# Patient Record
Sex: FEMALE | Race: WHITE | URBAN - METROPOLITAN AREA
[De-identification: names, ages, dates, MRNs, and addresses within clinical notes are randomized per-mention and may not be internally consistent; named-entity substitution may affect disease eponyms.]

---

## 2023-07-27 ENCOUNTER — OFFICE VISIT (OUTPATIENT)
Dept: OBGYN CLINIC | Facility: CLINIC | Age: 23
End: 2023-07-27

## 2023-07-27 VITALS
WEIGHT: 240 LBS | HEIGHT: 63 IN | SYSTOLIC BLOOD PRESSURE: 110 MMHG | BODY MASS INDEX: 42.52 KG/M2 | DIASTOLIC BLOOD PRESSURE: 78 MMHG

## 2023-07-27 DIAGNOSIS — Z80.3 FAMILY HISTORY OF BREAST CANCER: ICD-10-CM

## 2023-07-27 DIAGNOSIS — Z01.419 ENCOUNTER FOR WELL WOMAN EXAM WITH ROUTINE GYNECOLOGICAL EXAM: Primary | ICD-10-CM

## 2023-07-27 DIAGNOSIS — Z11.3 SCREEN FOR STD (SEXUALLY TRANSMITTED DISEASE): ICD-10-CM

## 2023-07-27 PROCEDURE — 3008F BODY MASS INDEX DOCD: CPT | Performed by: OBSTETRICS & GYNECOLOGY

## 2023-07-27 PROCEDURE — 99385 PREV VISIT NEW AGE 18-39: CPT | Performed by: OBSTETRICS & GYNECOLOGY

## 2023-07-27 PROCEDURE — 3074F SYST BP LT 130 MM HG: CPT | Performed by: OBSTETRICS & GYNECOLOGY

## 2023-07-27 PROCEDURE — 3078F DIAST BP <80 MM HG: CPT | Performed by: OBSTETRICS & GYNECOLOGY

## 2023-07-27 RX ORDER — DAPSONE 50 MG/G
GEL TOPICAL
COMMUNITY
Start: 2023-07-19

## 2023-07-27 RX ORDER — SUMATRIPTAN 100 MG/1
100 TABLET, FILM COATED ORAL EVERY 2 HOUR PRN
COMMUNITY
Start: 2022-11-16

## 2023-07-27 RX ORDER — SPIRONOLACTONE 50 MG/1
50 TABLET, FILM COATED ORAL DAILY
COMMUNITY
Start: 2023-07-19

## 2023-07-28 LAB
C TRACH DNA SPEC QL NAA+PROBE: NEGATIVE
N GONORRHOEA DNA SPEC QL NAA+PROBE: NEGATIVE

## 2023-08-14 LAB — HPV I/H RISK 1 DNA SPEC QL NAA+PROBE: NEGATIVE

## 2023-08-24 PROBLEM — F34.1 DYSTHYMIC DISORDER: Status: ACTIVE | Noted: 2022-11-19

## 2023-08-24 PROBLEM — R30.0 DYSURIA: Status: ACTIVE | Noted: 2020-10-08

## 2023-08-24 PROBLEM — D36.9 DERMOID CYST: Status: ACTIVE | Noted: 2020-07-01

## 2023-08-29 ENCOUNTER — EKG ENCOUNTER (OUTPATIENT)
Dept: LAB | Age: 23
End: 2023-08-29
Attending: INTERNAL MEDICINE
Payer: COMMERCIAL

## 2023-08-29 ENCOUNTER — TELEPHONE (OUTPATIENT)
Dept: OBGYN CLINIC | Facility: CLINIC | Age: 23
End: 2023-08-29

## 2023-08-29 ENCOUNTER — OFFICE VISIT (OUTPATIENT)
Dept: INTERNAL MEDICINE CLINIC | Facility: CLINIC | Age: 23
End: 2023-08-29

## 2023-08-29 VITALS
HEART RATE: 94 BPM | BODY MASS INDEX: 42.52 KG/M2 | SYSTOLIC BLOOD PRESSURE: 104 MMHG | DIASTOLIC BLOOD PRESSURE: 70 MMHG | HEIGHT: 63 IN | WEIGHT: 240 LBS

## 2023-08-29 DIAGNOSIS — Z51.81 THERAPEUTIC DRUG MONITORING: ICD-10-CM

## 2023-08-29 DIAGNOSIS — G43.109 MIGRAINE WITH AURA AND WITHOUT STATUS MIGRAINOSUS, NOT INTRACTABLE: ICD-10-CM

## 2023-08-29 DIAGNOSIS — E66.01 OBESITY, CLASS III, BMI 40-49.9 (MORBID OBESITY) (HCC): ICD-10-CM

## 2023-08-29 DIAGNOSIS — Z00.00 ANNUAL PHYSICAL EXAM: Primary | ICD-10-CM

## 2023-08-29 LAB
ATRIAL RATE: 85 BPM
P AXIS: 40 DEGREES
P-R INTERVAL: 166 MS
Q-T INTERVAL: 354 MS
QRS DURATION: 76 MS
QTC CALCULATION (BEZET): 421 MS
R AXIS: 58 DEGREES
T AXIS: 23 DEGREES
VENTRICULAR RATE: 85 BPM

## 2023-08-29 PROCEDURE — 99204 OFFICE O/P NEW MOD 45 MIN: CPT | Performed by: INTERNAL MEDICINE

## 2023-08-29 PROCEDURE — 93010 ELECTROCARDIOGRAM REPORT: CPT | Performed by: INTERNAL MEDICINE

## 2023-08-29 PROCEDURE — 3008F BODY MASS INDEX DOCD: CPT | Performed by: INTERNAL MEDICINE

## 2023-08-29 PROCEDURE — 99385 PREV VISIT NEW AGE 18-39: CPT | Performed by: INTERNAL MEDICINE

## 2023-08-29 PROCEDURE — 93005 ELECTROCARDIOGRAM TRACING: CPT

## 2023-08-29 PROCEDURE — 3078F DIAST BP <80 MM HG: CPT | Performed by: INTERNAL MEDICINE

## 2023-08-29 PROCEDURE — 3074F SYST BP LT 130 MM HG: CPT | Performed by: INTERNAL MEDICINE

## 2023-08-29 RX ORDER — PHENTERMINE HYDROCHLORIDE 15 MG/1
15 CAPSULE ORAL EVERY MORNING
Qty: 30 CAPSULE | Refills: 0 | Status: SHIPPED | OUTPATIENT
Start: 2023-08-29

## 2023-08-30 ENCOUNTER — LAB ENCOUNTER (OUTPATIENT)
Dept: LAB | Age: 23
End: 2023-08-30
Attending: INTERNAL MEDICINE
Payer: COMMERCIAL

## 2023-08-30 DIAGNOSIS — Z00.00 ANNUAL PHYSICAL EXAM: ICD-10-CM

## 2023-08-30 LAB
ALBUMIN SERPL-MCNC: 3.6 G/DL (ref 3.4–5)
ALBUMIN/GLOB SERPL: 0.9 {RATIO} (ref 1–2)
ALP LIVER SERPL-CCNC: 81 U/L
ALT SERPL-CCNC: 45 U/L
ANION GAP SERPL CALC-SCNC: 5 MMOL/L (ref 0–18)
AST SERPL-CCNC: 27 U/L (ref 15–37)
BASOPHILS # BLD AUTO: 0.03 X10(3) UL (ref 0–0.2)
BASOPHILS NFR BLD AUTO: 0.3 %
BILIRUB SERPL-MCNC: 0.4 MG/DL (ref 0.1–2)
BUN BLD-MCNC: 10 MG/DL (ref 7–18)
BUN/CREAT SERPL: 11.2 (ref 10–20)
CALCIUM BLD-MCNC: 8.8 MG/DL (ref 8.5–10.1)
CHLORIDE SERPL-SCNC: 108 MMOL/L (ref 98–112)
CHOLEST SERPL-MCNC: 153 MG/DL (ref ?–200)
CO2 SERPL-SCNC: 26 MMOL/L (ref 21–32)
CREAT BLD-MCNC: 0.89 MG/DL
DEPRECATED RDW RBC AUTO: 44.7 FL (ref 35.1–46.3)
EGFRCR SERPLBLD CKD-EPI 2021: 93 ML/MIN/1.73M2 (ref 60–?)
EOSINOPHIL # BLD AUTO: 0.1 X10(3) UL (ref 0–0.7)
EOSINOPHIL NFR BLD AUTO: 1.1 %
ERYTHROCYTE [DISTWIDTH] IN BLOOD BY AUTOMATED COUNT: 13 % (ref 11–15)
FASTING PATIENT LIPID ANSWER: YES
FASTING STATUS PATIENT QL REPORTED: YES
GLOBULIN PLAS-MCNC: 3.8 G/DL (ref 2.8–4.4)
GLUCOSE BLD-MCNC: 92 MG/DL (ref 70–99)
HCT VFR BLD AUTO: 43.6 %
HDLC SERPL-MCNC: 47 MG/DL (ref 40–59)
HGB BLD-MCNC: 14.5 G/DL
IMM GRANULOCYTES # BLD AUTO: 0.03 X10(3) UL (ref 0–1)
IMM GRANULOCYTES NFR BLD: 0.3 %
LDLC SERPL CALC-MCNC: 91 MG/DL (ref ?–100)
LYMPHOCYTES # BLD AUTO: 2.14 X10(3) UL (ref 1–4)
LYMPHOCYTES NFR BLD AUTO: 24.3 %
MCH RBC QN AUTO: 31.2 PG (ref 26–34)
MCHC RBC AUTO-ENTMCNC: 33.3 G/DL (ref 31–37)
MCV RBC AUTO: 93.8 FL
MONOCYTES # BLD AUTO: 0.52 X10(3) UL (ref 0.1–1)
MONOCYTES NFR BLD AUTO: 5.9 %
NEUTROPHILS # BLD AUTO: 5.97 X10 (3) UL (ref 1.5–7.7)
NEUTROPHILS # BLD AUTO: 5.97 X10(3) UL (ref 1.5–7.7)
NEUTROPHILS NFR BLD AUTO: 68.1 %
NONHDLC SERPL-MCNC: 106 MG/DL (ref ?–130)
OSMOLALITY SERPL CALC.SUM OF ELEC: 287 MOSM/KG (ref 275–295)
PLATELET # BLD AUTO: 333 10(3)UL (ref 150–450)
POTASSIUM SERPL-SCNC: 4.2 MMOL/L (ref 3.5–5.1)
PROT SERPL-MCNC: 7.4 G/DL (ref 6.4–8.2)
RBC # BLD AUTO: 4.65 X10(6)UL
SODIUM SERPL-SCNC: 139 MMOL/L (ref 136–145)
TRIGL SERPL-MCNC: 76 MG/DL (ref 30–149)
TSI SER-ACNC: 1.56 MIU/ML (ref 0.36–3.74)
VIT D+METAB SERPL-MCNC: 27.8 NG/ML (ref 30–100)
VLDLC SERPL CALC-MCNC: 12 MG/DL (ref 0–30)
WBC # BLD AUTO: 8.8 X10(3) UL (ref 4–11)

## 2023-08-30 PROCEDURE — 85025 COMPLETE CBC W/AUTO DIFF WBC: CPT

## 2023-08-30 PROCEDURE — 82306 VITAMIN D 25 HYDROXY: CPT

## 2023-08-30 PROCEDURE — 80061 LIPID PANEL: CPT

## 2023-08-30 PROCEDURE — 80053 COMPREHEN METABOLIC PANEL: CPT

## 2023-08-30 PROCEDURE — 36415 COLL VENOUS BLD VENIPUNCTURE: CPT

## 2023-08-30 PROCEDURE — 84443 ASSAY THYROID STIM HORMONE: CPT

## 2023-08-31 RX ORDER — ERGOCALCIFEROL 1.25 MG/1
50000 CAPSULE ORAL WEEKLY
Qty: 12 CAPSULE | Refills: 0 | Status: SHIPPED | OUTPATIENT
Start: 2023-08-31 | End: 2023-11-17

## 2023-09-26 ENCOUNTER — TELEPHONE (OUTPATIENT)
Dept: INTERNAL MEDICINE CLINIC | Facility: CLINIC | Age: 23
End: 2023-09-26

## 2023-09-26 ENCOUNTER — OFFICE VISIT (OUTPATIENT)
Dept: INTERNAL MEDICINE CLINIC | Facility: CLINIC | Age: 23
End: 2023-09-26

## 2023-09-26 VITALS
BODY MASS INDEX: 40.54 KG/M2 | HEIGHT: 63 IN | RESPIRATION RATE: 18 BRPM | WEIGHT: 228.81 LBS | SYSTOLIC BLOOD PRESSURE: 108 MMHG | HEART RATE: 83 BPM | OXYGEN SATURATION: 99 % | DIASTOLIC BLOOD PRESSURE: 72 MMHG

## 2023-09-26 DIAGNOSIS — E55.9 VITAMIN D DEFICIENCY: ICD-10-CM

## 2023-09-26 DIAGNOSIS — G43.109 MIGRAINE WITH AURA AND WITHOUT STATUS MIGRAINOSUS, NOT INTRACTABLE: Primary | ICD-10-CM

## 2023-09-26 DIAGNOSIS — E66.01 OBESITY, CLASS III, BMI 40-49.9 (MORBID OBESITY) (HCC): ICD-10-CM

## 2023-09-26 DIAGNOSIS — Z51.81 THERAPEUTIC DRUG MONITORING: ICD-10-CM

## 2023-09-26 PROCEDURE — 3008F BODY MASS INDEX DOCD: CPT | Performed by: INTERNAL MEDICINE

## 2023-09-26 PROCEDURE — 3074F SYST BP LT 130 MM HG: CPT | Performed by: INTERNAL MEDICINE

## 2023-09-26 PROCEDURE — 99214 OFFICE O/P EST MOD 30 MIN: CPT | Performed by: INTERNAL MEDICINE

## 2023-09-26 PROCEDURE — 3078F DIAST BP <80 MM HG: CPT | Performed by: INTERNAL MEDICINE

## 2023-09-26 RX ORDER — PHENTERMINE HYDROCHLORIDE 37.5 MG/1
37.5 TABLET ORAL
Qty: 30 TABLET | Refills: 0 | Status: SHIPPED | OUTPATIENT
Start: 2023-09-26 | End: 2023-10-26

## 2023-09-26 RX ORDER — DOXYCYCLINE HYCLATE 100 MG/1
100 CAPSULE ORAL 2 TIMES DAILY WITH MEALS
COMMUNITY
Start: 2023-09-05

## 2023-09-26 NOTE — TELEPHONE ENCOUNTER
Received fax requesting PA    Current Outpatient Medications:       Phentermine HCl 37.5 MG Oral Tab, Take 1 tablet (37.5 mg total) by mouth every morning before breakfast., Disp: 30 tablet, Rfl: 0    KEY [id number]

## 2023-09-27 NOTE — TELEPHONE ENCOUNTER
Approved    Prior authorization approved Case ID: [id number]      Payer: [employer]    [phone]    [phone]   [id number]; Review Type:Prior Auth; Coverage Start Date:2023; Coverage End Date:2023;   Approval Details    Authorized from 2023 to 2023      Electronic appeal: Not supported   View History

## 2023-09-28 ENCOUNTER — TELEPHONE (OUTPATIENT)
Dept: INTERNAL MEDICINE CLINIC | Facility: CLINIC | Age: 23
End: 2023-09-28

## 2023-09-28 NOTE — TELEPHONE ENCOUNTER
Current Outpatient Medications:     Phentermine HCl 37.5 MG Oral Tab, Take 1 tablet (37.5 mg total) by mouth every morning before breakfast., Disp: 30 tablet, Rfl: 0

## 2023-10-03 ENCOUNTER — TELEPHONE (OUTPATIENT)
Dept: INTERNAL MEDICINE CLINIC | Facility: CLINIC | Age: 23
End: 2023-10-03

## 2023-10-03 DIAGNOSIS — R94.31 ABNORMAL EKG: Primary | ICD-10-CM

## 2023-10-03 NOTE — TELEPHONE ENCOUNTER
Pt saw Dr Darlene Howard on 9-26-23, she was told Dr Darlene Howard would enter an echocardiogram, none in to schedule. Send pt a mychart when entered.

## 2023-10-07 ENCOUNTER — PATIENT MESSAGE (OUTPATIENT)
Dept: INTERNAL MEDICINE CLINIC | Facility: CLINIC | Age: 23
End: 2023-10-07

## 2023-11-02 ENCOUNTER — OFFICE VISIT (OUTPATIENT)
Dept: INTERNAL MEDICINE CLINIC | Facility: CLINIC | Age: 23
End: 2023-11-02
Payer: COMMERCIAL

## 2023-11-02 VITALS
BODY MASS INDEX: 40.11 KG/M2 | OXYGEN SATURATION: 98 % | DIASTOLIC BLOOD PRESSURE: 76 MMHG | WEIGHT: 226.38 LBS | SYSTOLIC BLOOD PRESSURE: 116 MMHG | HEIGHT: 63 IN | HEART RATE: 80 BPM

## 2023-11-02 DIAGNOSIS — R94.31 ABNORMAL EKG: ICD-10-CM

## 2023-11-02 DIAGNOSIS — Z51.81 THERAPEUTIC DRUG MONITORING: ICD-10-CM

## 2023-11-02 DIAGNOSIS — G43.109 MIGRAINE WITH AURA AND WITHOUT STATUS MIGRAINOSUS, NOT INTRACTABLE: ICD-10-CM

## 2023-11-02 DIAGNOSIS — E55.9 VITAMIN D DEFICIENCY: Primary | ICD-10-CM

## 2023-11-02 DIAGNOSIS — E66.01 OBESITY, CLASS III, BMI 40-49.9 (MORBID OBESITY) (HCC): ICD-10-CM

## 2023-11-02 PROCEDURE — 3078F DIAST BP <80 MM HG: CPT | Performed by: INTERNAL MEDICINE

## 2023-11-02 PROCEDURE — 3008F BODY MASS INDEX DOCD: CPT | Performed by: INTERNAL MEDICINE

## 2023-11-02 PROCEDURE — 3074F SYST BP LT 130 MM HG: CPT | Performed by: INTERNAL MEDICINE

## 2023-11-02 PROCEDURE — 99214 OFFICE O/P EST MOD 30 MIN: CPT | Performed by: INTERNAL MEDICINE

## 2023-11-02 RX ORDER — PHENTERMINE HYDROCHLORIDE 37.5 MG/1
37.5 CAPSULE ORAL EVERY MORNING
Qty: 30 CAPSULE | Refills: 1 | Status: SHIPPED | OUTPATIENT
Start: 2023-11-02 | End: 2024-01-01

## 2023-11-03 ENCOUNTER — TELEPHONE (OUTPATIENT)
Dept: INTERNAL MEDICINE CLINIC | Facility: CLINIC | Age: 23
End: 2023-11-03

## 2023-11-03 NOTE — TELEPHONE ENCOUNTER
Received fax from pharmacy requesting PA         Phentermine HCl 37.5 MG Oral Cap, Take 1 capsule (37.5 mg total) by mouth every morning., Disp: 30 capsule, Rfl: 1    KEY: [id number]

## 2023-11-03 NOTE — TELEPHONE ENCOUNTER
Approved =sent patient mychart message    Prior authorization approved Case ID: [id number]      Payer: [employer] 19    [phone]    [phone]   CaseId:[id number];Status:Approved; Review Type:Prior Auth; Coverage Start Date:10/04/2023; Coverage End Date:02/01/2024;    Approval Details    Authorized from October 4, 2023 to February 1, 2024

## 2023-12-27 ENCOUNTER — HOSPITAL ENCOUNTER (OUTPATIENT)
Dept: CV DIAGNOSTICS | Facility: HOSPITAL | Age: 23
Discharge: HOME | End: 2023-12-27
Attending: INTERNAL MEDICINE
Payer: COMMERCIAL

## 2023-12-27 DIAGNOSIS — R94.31 ABNORMAL EKG: ICD-10-CM

## 2023-12-27 PROCEDURE — 93306 TTE W/DOPPLER COMPLETE: CPT | Performed by: INTERNAL MEDICINE

## 2023-12-28 ENCOUNTER — TELEPHONE (OUTPATIENT)
Dept: INTERNAL MEDICINE CLINIC | Facility: CLINIC | Age: 23
End: 2023-12-28

## 2023-12-28 NOTE — TELEPHONE ENCOUNTER
Patient calling (identified name and ) states she had an Echo done yesterday. States she is aware that Dr. Moreno has not reviewed the results yet but is very concerned and anxious with the abnormal report regarding an \"interatrial septal aneurysm\". Please advise.

## 2024-01-02 ENCOUNTER — OFFICE VISIT (OUTPATIENT)
Dept: INTERNAL MEDICINE CLINIC | Facility: CLINIC | Age: 24
End: 2024-01-02
Payer: COMMERCIAL

## 2024-01-02 VITALS
OXYGEN SATURATION: 99 % | HEART RATE: 98 BPM | SYSTOLIC BLOOD PRESSURE: 109 MMHG | WEIGHT: 219.81 LBS | HEIGHT: 63 IN | DIASTOLIC BLOOD PRESSURE: 74 MMHG | BODY MASS INDEX: 38.95 KG/M2

## 2024-01-02 DIAGNOSIS — E55.9 VITAMIN D DEFICIENCY: Primary | ICD-10-CM

## 2024-01-02 DIAGNOSIS — G43.109 MIGRAINE WITH AURA AND WITHOUT STATUS MIGRAINOSUS, NOT INTRACTABLE: ICD-10-CM

## 2024-01-02 DIAGNOSIS — E66.01 OBESITY, CLASS III, BMI 40-49.9 (MORBID OBESITY) (HCC): ICD-10-CM

## 2024-01-02 DIAGNOSIS — Z51.81 THERAPEUTIC DRUG MONITORING: ICD-10-CM

## 2024-01-02 PROCEDURE — 3078F DIAST BP <80 MM HG: CPT | Performed by: INTERNAL MEDICINE

## 2024-01-02 PROCEDURE — 3074F SYST BP LT 130 MM HG: CPT | Performed by: INTERNAL MEDICINE

## 2024-01-02 PROCEDURE — 3008F BODY MASS INDEX DOCD: CPT | Performed by: INTERNAL MEDICINE

## 2024-01-02 PROCEDURE — 99214 OFFICE O/P EST MOD 30 MIN: CPT | Performed by: INTERNAL MEDICINE

## 2024-01-02 RX ORDER — PHENTERMINE HYDROCHLORIDE 37.5 MG/1
37.5 CAPSULE ORAL EVERY MORNING
Qty: 30 CAPSULE | Refills: 1 | Status: SHIPPED | OUTPATIENT
Start: 2024-01-02

## 2024-01-02 NOTE — PROGRESS NOTES
CC:   Chief Complaint   Patient presents with    Follow - Up     2 month       Reason for medical consultation: Medical Management of Weight and Weight related comorbidities.      HPI:   Initial weight: 240 lbs 8/2023  Current weight: 219 lbs  Interval weight loss: 7 lbs  Total weight loss: 21 lbs    Body mass index is 38.94 kg/m².   Wt Readings from Last 6 Encounters:   01/02/24 219 lb 12.8 oz (99.7 kg)   11/02/23 226 lb 6.4 oz (102.7 kg)   09/26/23 228 lb 12.8 oz (103.8 kg)   08/29/23 240 lb (108.9 kg)   07/27/23 240 lb (108.9 kg)   11/23/16 153 lb (69.4 kg) (88%, Z= 1.18)*     * Growth percentiles are based on CDC (Girls, 2-20 Years) data.     /74   Pulse 98   Ht 5' 3\" (1.6 m)   Wt 219 lb 12.8 oz (99.7 kg)   SpO2 99%   BMI 38.94 kg/m²     LABS and RESULTS:     Office Visit on 07/27/2023   Component Date Value    Chlamydia Trachomatis Am* 07/27/2023 Negative     Neisseria Gonorrhoeae Am* 07/27/2023 Negative     Chlam/GC Source 07/27/2023 Cervical/endocervical     Thin Prep Pap 07/27/2023 AP TEST REFLEXED     Interpretation/Result 07/27/2023 Negative for intraepithelial lesion or malignancy     Specimen Adequacy 07/27/2023 Satisfactory for evaluation. No endocervical or metaplastic cells seen     General Categorization 07/27/2023 Negative for intraepithelial lesion or malignancy     HPV High Risk mRNA 07/27/2023                      Value:This result contains rich text formatting which cannot be displayed here.    Recommendations/Comments 07/27/2023                      Value:This result contains rich text formatting which cannot be displayed here.    Procedure 07/27/2023                      Value:This result contains rich text formatting which cannot be displayed here.    Clinical Information 07/27/2023                      Value:This result contains rich text formatting which cannot be displayed here.    Reason for testing 07/27/2023 Screening     Gyn Additional Informati* 07/27/2023                       Value:This result contains rich text formatting which cannot be displayed here.    Case Report 07/27/2023                      Value:Gynecologic Cytology                              Case: [id number]                                  Authorizing Provider:  Becky Romo MD     Collected:           07/27/2023 07:15 PM          Ordering Location:     [facility]    Received:            07/31/2023 10:02 AM                                 Group, [address] [place] [address]                                                                    First Screen:          Lucía Man                                                               Specimen:    ThinPrep Imager Screening Pap, Cervical/endocervical                                       HPV High Risk 07/27/2023 Negative     HPV Source 07/27/2023 Cervical/endocervical        Current Outpatient Medications   Medication Sig Dispense Refill    SUMAtriptan Succinate 100 MG Oral Tab Take 1 tablet (100 mg total) by mouth every 2 (two) hours as needed.      Dapsone 5 % External Gel Apply topically.      tretinoin 0.025 % External Cream Apply topically.        Past Medical History:   Diagnosis Date    Allergic rhinitis     Migraine        Past Surgical History:   Procedure Laterality Date    OOPHORECTOMY Left     SALPINGECTOMY Left        Social History:  Social History     Socioeconomic History    Marital status: Single   Tobacco Use    Smoking status: Never    Smokeless tobacco: Never   Substance and Sexual Activity    Alcohol use: Yes     Comment: Social    Drug use: Never     Family History:  Family History   Problem Relation Age of Onset    Hypertension Father     Breast Cancer Mother 43    Breast Cancer Maternal Grandmother 80          REVIEW OF SYSTEMS:   10 point review of systems otherwise negative.  With the exception of HPI and assessment and plan        EXAM:   /74    Pulse 98   Ht 5' 3\" (1.6 m)   Wt 219 lb 12.8 oz (99.7 kg)   SpO2 99%   BMI 38.94 kg/m²   Body mass index is 38.94 kg/m².     GENERAL: NAD, conversant  SKIN: good turgor, no jaundice  HEENT: no scleral icterus,conjunctiva are clear, oropharynx clear, nl external nose and ears  NECK: supple, no carotid bruits, thyroid normal  LYMPH: no cervical LAD, no supraclavicular LAD  LUNGS: nl effort, clear to auscultation bilaterally  CARDIO: RRR, no murmur  ABDOMEN: NT/ND, no masses, no HSM   EXTREMITIES: gait normal, no edema   PSYCH: Oriented times three, appropriate affect    ASSESSMENT AND PLAN:     Nutritional Counseling: educate, track, dietician    Vitamin D Deficiency  2. Migraine with aura and without status migrainosus, not intractable  3. Obesity, Class III, BMI 40-49.9 (morbid obesity) (HCC)  4. Therapeutic drug monitoring  -Initial weight 240, BMI 42.5  -failed diet and exercise program >6 months  -denies any hx of CAD, htn, kidney stones or seizure  PHENTERMINE: Since the patient would like to try phentermine, and is aware of the potential side effects (hypertension, palpitations, tachycardia, and anxiety), I will give her a prescription today to be used in conjunction with the above diet and exercise program. The patient will check heart rate and blood pressure on a regular basis. Patient will call me if the BP goes over 140/90 or has palpitations or racing heart rate. They understands that I will not call in the prescription and an appointment is needed to have the medication refilled.   -EKG 8/2023 NSR, possible LAE  -labs 8/2023 vit D deficiency, normal cbc, TSH, cmp  - Echo 12/2023 wnl  Plan:    - Phentermine HCl 37.5 MG Oral Cap; Take 1 capsule (15 mg total) by mouth every morning.  Dispense: 30 capsule; Refill: 0  -Discussed Kcal goals <1800 Kcal, carbohydrate goal <100 grams  -Exercise goal 1 hour daily  -Stress management 10 minutes meditation before bedtime  -Drink at least 64 oz water  daily      Regarding Obesity:  Consulted on nutrition and activity  Advised portion-controlled/low carb diet <100 g daily.  Discussed sleep and stress management.    Regarding weight loss medications.  I've discussed with patients the risks and benefits of such weight loss medications.  That these are treatments to be used in conjunction with diet and exercise for promoting health and weight loss.                No follow-ups on file.       Suzanne Moreno MD

## 2024-01-24 DIAGNOSIS — E66.01 OBESITY, CLASS III, BMI 40-49.9 (MORBID OBESITY) (HCC): ICD-10-CM

## 2024-01-25 DIAGNOSIS — E66.01 OBESITY, CLASS III, BMI 40-49.9 (MORBID OBESITY) (HCC): ICD-10-CM

## 2024-01-25 RX ORDER — PHENTERMINE HYDROCHLORIDE 37.5 MG/1
37.5 CAPSULE ORAL EVERY MORNING
Qty: 30 CAPSULE | Refills: 0 | OUTPATIENT
Start: 2024-01-25

## 2024-01-25 NOTE — TELEPHONE ENCOUNTER
Pharmacy    [id number] - [place], [place] - [address] AT [facility], [phone], [phone]      Disp Refills Start End    Phentermine HCl 37.5 MG Oral Cap 30 capsule 1 1/2/2024 --    Sig - Route: Take 1 capsule (37.5 mg total) by mouth every morning. - Oral    Sent to pharmacy as: Phentermine HCl 37.5 MG Oral Capsule    E-Prescribing Status: Receipt confirmed by pharmacy (1/2/2024  8:13 AM CST)

## 2024-01-25 NOTE — TELEPHONE ENCOUNTER
Patient calling ( identified name and  ) states she did not get the  Phentermine medication from 2024  because was told by [facility] is was too early to fill     Today the patient was told by [facility] they do not have the prescription       Called [facility]  spoke with no one after a 6 minute hold    Called [facility] again, spoke with Nina pharmacist   Nina states she does not have the RX from 2024  Nina took verbal for this RX ,  RX will be processed today    Called patient ( identified name and  ) informed RX will be ready today

## 2024-01-26 NOTE — TELEPHONE ENCOUNTER
Protocol Failed/ No Protocol    Requested Prescriptions   Pending Prescriptions Disp Refills    PHENTERMINE HCL 37.5 MG Oral Cap [Pharmacy Med Name: PHENTERMINE 37.5MG CAPSULES] 30 capsule 0     Sig: TAKE 1 CAPSULE(37.5 MG) BY MOUTH EVERY MORNING       There is no refill protocol information for this order          Future Appointments         Provider Department Appt Notes    In 1 month Suzanne Moreno MD [facility] 2 mth follow up    In 6 months Becky Romo MD [facility] - OB/GYN Annual          Recent Outpatient Visits              3 weeks ago Vitamin D deficiency    [facility] Suzanne Moreno MD    Office Visit    2 months ago Vitamin D deficiency    [facility] Suzanne Moreno MD    Office Visit    4 months ago Migraine with aura and without status migrainosus, not intractable    [facility] Suzanne Moreno MD    Office Visit    4 months ago Annual physical exam    [facility] Suzanne Moreno MD    Office Visit    6 months ago Encounter for well woman exam with routine gynecological exam    [address] Becky Romo MD    Office Visit

## 2024-01-29 RX ORDER — PHENTERMINE HYDROCHLORIDE 37.5 MG/1
37.5 CAPSULE ORAL EVERY MORNING
Qty: 30 CAPSULE | Refills: 1 | Status: SHIPPED | OUTPATIENT
Start: 2024-01-29

## 2024-03-04 ENCOUNTER — OFFICE VISIT (OUTPATIENT)
Dept: INTERNAL MEDICINE CLINIC | Facility: CLINIC | Age: 24
End: 2024-03-04
Payer: COMMERCIAL

## 2024-03-04 ENCOUNTER — TELEPHONE (OUTPATIENT)
Dept: INTERNAL MEDICINE CLINIC | Facility: CLINIC | Age: 24
End: 2024-03-04

## 2024-03-04 VITALS
WEIGHT: 206.38 LBS | HEART RATE: 92 BPM | DIASTOLIC BLOOD PRESSURE: 75 MMHG | SYSTOLIC BLOOD PRESSURE: 119 MMHG | OXYGEN SATURATION: 96 % | HEIGHT: 63 IN | BODY MASS INDEX: 36.57 KG/M2

## 2024-03-04 DIAGNOSIS — E55.9 VITAMIN D DEFICIENCY: ICD-10-CM

## 2024-03-04 DIAGNOSIS — G43.109 MIGRAINE WITH AURA AND WITHOUT STATUS MIGRAINOSUS, NOT INTRACTABLE: ICD-10-CM

## 2024-03-04 DIAGNOSIS — Z51.81 THERAPEUTIC DRUG MONITORING: ICD-10-CM

## 2024-03-04 DIAGNOSIS — E66.01 OBESITY, CLASS III, BMI 40-49.9 (MORBID OBESITY) (HCC): Primary | ICD-10-CM

## 2024-03-04 DIAGNOSIS — F34.1 DYSTHYMIC DISORDER: ICD-10-CM

## 2024-03-04 PROCEDURE — 3078F DIAST BP <80 MM HG: CPT | Performed by: INTERNAL MEDICINE

## 2024-03-04 PROCEDURE — 3008F BODY MASS INDEX DOCD: CPT | Performed by: INTERNAL MEDICINE

## 2024-03-04 PROCEDURE — 99214 OFFICE O/P EST MOD 30 MIN: CPT | Performed by: INTERNAL MEDICINE

## 2024-03-04 PROCEDURE — 3074F SYST BP LT 130 MM HG: CPT | Performed by: INTERNAL MEDICINE

## 2024-03-04 RX ORDER — ADAPALENE GEL USP, 0.3% 3 MG/G
GEL TOPICAL NIGHTLY
COMMUNITY
Start: 2024-01-25

## 2024-03-04 RX ORDER — DOXYCYCLINE HYCLATE 100 MG/1
100 CAPSULE ORAL 2 TIMES DAILY WITH MEALS
COMMUNITY
Start: 2024-02-14

## 2024-03-04 RX ORDER — KETOCONAZOLE 20 MG/ML
1 SHAMPOO TOPICAL
COMMUNITY
Start: 2024-01-17

## 2024-03-04 RX ORDER — PHENTERMINE HYDROCHLORIDE 37.5 MG/1
37.5 CAPSULE ORAL EVERY MORNING
Qty: 30 CAPSULE | Refills: 1 | Status: SHIPPED | OUTPATIENT
Start: 2024-03-04

## 2024-03-04 NOTE — TELEPHONE ENCOUNTER
Phentermine prior auth completed     Approved    Prior authorization approved Case ID: [id number]      Payer: [employer]    [phone]    [phone]   CaseId:[id number];Status:Approved;Review Type:Prior Auth;Coverage Start Date:02/03/2024;Coverage End Date:03/04/2025;   Approval Details    Authorized from February 3, 2024 to March 4, 2025      Electronic appeal: Not supported   View History

## 2024-03-04 NOTE — PROGRESS NOTES
CC:   Chief Complaint   Patient presents with    Follow - Up     2 month       Reason for medical consultation: Medical Management of Weight and Weight related comorbidities.      HPI:   Patient gets stressed around this time of year   She is on waitlist for therapy  Will send [facility] referral to [facility]     Initial weight: 240 lbs 8/2023  Current weight: 209 lbs  Interval weight loss: 10 lbs  Total weight loss: 31 lbs    Body mass index is 36.56 kg/m².   Wt Readings from Last 6 Encounters:   03/04/24 206 lb 6.4 oz (93.6 kg)   01/02/24 219 lb 12.8 oz (99.7 kg)   11/02/23 226 lb 6.4 oz (102.7 kg)   09/26/23 228 lb 12.8 oz (103.8 kg)   08/29/23 240 lb (108.9 kg)   07/27/23 240 lb (108.9 kg)     /75   Pulse 92   Ht 5' 3\" (1.6 m)   Wt 206 lb 6.4 oz (93.6 kg)   SpO2 96%   BMI 36.56 kg/m²     LABS and RESULTS:     Office Visit on 07/27/2023   Component Date Value    Chlamydia Trachomatis Am* 07/27/2023 Negative     Neisseria Gonorrhoeae Am* 07/27/2023 Negative     Chlam/GC Source 07/27/2023 Cervical/endocervical     Thin Prep Pap 07/27/2023 AP TEST REFLEXED     Interpretation/Result 07/27/2023 Negative for intraepithelial lesion or malignancy     Specimen Adequacy 07/27/2023 Satisfactory for evaluation. No endocervical or metaplastic cells seen     General Categorization 07/27/2023 Negative for intraepithelial lesion or malignancy     HPV High Risk mRNA 07/27/2023                      Value:This result contains rich text formatting which cannot be displayed here.    Recommendations/Comments 07/27/2023                      Value:This result contains rich text formatting which cannot be displayed here.    Procedure 07/27/2023                      Value:This result contains rich text formatting which cannot be displayed here.    Clinical Information 07/27/2023                      Value:This result contains rich text formatting which cannot be displayed here.    Reason for testing 07/27/2023 Screening     Gyn  Additional Informati* 07/27/2023                      Value:This result contains rich text formatting which cannot be displayed here.    Case Report 07/27/2023                      Value:Gynecologic Cytology                              Case: [id number]                                  Authorizing Provider:  Becky Romo MD     Collected:           07/27/2023 07:15 PM          Ordering Location:     [facility]    Received:            07/31/2023 10:02 AM                                 Group, [address] [place] [address]                                                                    First Screen:          Lucía Man                                                               Specimen:    ThinPrep Imager Screening Pap, Cervical/endocervical                                       HPV High Risk 07/27/2023 Negative     HPV Source 07/27/2023 Cervical/endocervical        Current Outpatient Medications   Medication Sig Dispense Refill    Adapalene 0.3 % External Gel Apply topically nightly.      doxycycline 100 MG Oral Cap Take 1 capsule (100 mg total) by mouth 2 (two) times daily with meals.      ketoconazole 2 % External Shampoo Apply 1 Application topically 3 (three) times a week.      Phentermine HCl 37.5 MG Oral Cap Take 1 capsule (37.5 mg total) by mouth every morning. 30 capsule 1    SUMAtriptan Succinate 100 MG Oral Tab Take 1 tablet (100 mg total) by mouth every 2 (two) hours as needed.      Dapsone 5 % External Gel Apply topically.      tretinoin 0.025 % External Cream Apply topically.        Past Medical History:   Diagnosis Date    Allergic rhinitis     Migraine        Past Surgical History:   Procedure Laterality Date    OOPHORECTOMY Left     SALPINGECTOMY Left        Social History:  Social History     Socioeconomic History    Marital status: Single   Tobacco Use    Smoking status: Never    Smokeless tobacco:  Never   Substance and Sexual Activity    Alcohol use: Yes     Comment: Social    Drug use: Never     Family History:  Family History   Problem Relation Age of Onset    Hypertension Father     Breast Cancer Mother 43    Breast Cancer Maternal Grandmother 80          REVIEW OF SYSTEMS:   10 point review of systems otherwise negative.  With the exception of HPI and assessment and plan        EXAM:   /75   Pulse 92   Ht 5' 3\" (1.6 m)   Wt 206 lb 6.4 oz (93.6 kg)   SpO2 96%   BMI 36.56 kg/m²   Body mass index is 36.56 kg/m².     GENERAL: NAD, conversant  SKIN: good turgor, no jaundice  HEENT: no scleral icterus,conjunctiva are clear, oropharynx clear, nl external nose and ears  NECK: supple, no carotid bruits, thyroid normal  LYMPH: no cervical LAD, no supraclavicular LAD  LUNGS: nl effort, clear to auscultation bilaterally  CARDIO: RRR, no murmur  ABDOMEN: NT/ND, no masses, no HSM   EXTREMITIES: gait normal, no edema   PSYCH: Oriented times three, appropriate affect    ASSESSMENT AND PLAN:     Nutritional Counseling: educate, track, dietician    1. Obesity, Class III, BMI 40-49.9 (morbid obesity) (HCC)  2. Vitamin D deficiency  3. Migraine with aura and without status migrainosus, not intractable  4. Therapeutic drug monitoring  5. Dysthymic disorder    -Initial weight 240, BMI 42.5  -failed diet and exercise program >6 months  -denies any hx of CAD, htn, kidney stones or seizure  -PHENTERMINE: Since the patient would like to try phentermine, and is aware of the potential side effects (hypertension, palpitations, tachycardia, and anxiety), I will give her a prescription today to be used in conjunction with the above diet and exercise program. The patient will check heart rate and blood pressure on a regular basis. Patient will call me if the BP goes over 140/90 or has palpitations or racing heart rate. They understands that I will not call in the prescription and an appointment is needed to have the  medication refilled.   -EKG 8/2023 NSR, possible LAE  -labs 8/2023 vit D deficiency, normal cbc, TSH, cmp  - Echo 12/2023 wnl    Plan:  -recheck vitamin D  - Phentermine HCl 37.5 MG Oral Cap; Take 1 capsule (37.5 mg total) by mouth every morning.  Dispense: 30 capsule; Refill: 1  -Discussed Kcal goals <1800 Kcal, carbohydrate goal <100 grams  -Exercise goal 1 hour daily  -Stress management 10 minutes meditation before bedtime  -Drink at least 64 oz water daily      Regarding Obesity:  Consulted on nutrition and activity  Advised portion-controlled/low carb diet <100 g daily.  Discussed sleep and stress management.    Regarding weight loss medications.  I've discussed with patients the risks and benefits of such weight loss medications.  That these are treatments to be used in conjunction with diet and exercise for promoting health and weight loss.                Return in about 2 months (around 5/4/2024).       Suzanne Moreno MD

## 2024-03-06 ENCOUNTER — LAB ENCOUNTER (OUTPATIENT)
Dept: LAB | Age: 24
End: 2024-03-06
Attending: INTERNAL MEDICINE
Payer: COMMERCIAL

## 2024-03-06 DIAGNOSIS — F34.1 DYSTHYMIC DISORDER: ICD-10-CM

## 2024-03-06 DIAGNOSIS — E55.9 VITAMIN D DEFICIENCY: ICD-10-CM

## 2024-03-06 DIAGNOSIS — Z51.81 THERAPEUTIC DRUG MONITORING: ICD-10-CM

## 2024-03-06 LAB
VIT B12 SERPL-MCNC: 632 PG/ML (ref 211–911)
VIT D+METAB SERPL-MCNC: 36.4 NG/ML (ref 30–100)

## 2024-03-06 PROCEDURE — 82306 VITAMIN D 25 HYDROXY: CPT

## 2024-03-06 PROCEDURE — 36415 COLL VENOUS BLD VENIPUNCTURE: CPT

## 2024-03-06 PROCEDURE — 82607 VITAMIN B-12: CPT

## 2024-04-09 ENCOUNTER — TELEPHONE (OUTPATIENT)
Dept: OBGYN CLINIC | Facility: CLINIC | Age: 24
End: 2024-04-09

## 2024-04-09 NOTE — TELEPHONE ENCOUNTER
Pt with Mirena IUD. Currently some light spotting and light bleeding for 6 days; reports recent intercourse prior to bleeding. Pt states she typically doesn't get a period with the Mierna and might have light spotting about once every 6 months. Due to atypical bleeding, appointment for evaluation recommended. Appointment scheduled with [facility] 4/11.

## 2024-04-11 ENCOUNTER — OFFICE VISIT (OUTPATIENT)
Dept: OBGYN CLINIC | Facility: CLINIC | Age: 24
End: 2024-04-11

## 2024-04-11 VITALS
BODY MASS INDEX: 35.82 KG/M2 | WEIGHT: 202.19 LBS | SYSTOLIC BLOOD PRESSURE: 106 MMHG | HEIGHT: 63 IN | DIASTOLIC BLOOD PRESSURE: 68 MMHG

## 2024-04-11 DIAGNOSIS — Z97.5 BREAKTHROUGH BLEEDING ASSOCIATED WITH INTRAUTERINE DEVICE (IUD): Primary | ICD-10-CM

## 2024-04-11 DIAGNOSIS — N92.1 BREAKTHROUGH BLEEDING ASSOCIATED WITH INTRAUTERINE DEVICE (IUD): Primary | ICD-10-CM

## 2024-04-11 PROCEDURE — 3074F SYST BP LT 130 MM HG: CPT | Performed by: OBSTETRICS & GYNECOLOGY

## 2024-04-11 PROCEDURE — 3078F DIAST BP <80 MM HG: CPT | Performed by: OBSTETRICS & GYNECOLOGY

## 2024-04-11 PROCEDURE — 3008F BODY MASS INDEX DOCD: CPT | Performed by: OBSTETRICS & GYNECOLOGY

## 2024-04-11 PROCEDURE — 99213 OFFICE O/P EST LOW 20 MIN: CPT | Performed by: OBSTETRICS & GYNECOLOGY

## 2024-04-11 NOTE — PROGRESS NOTES
Chief Complaint   Patient presents with    IUD     check         Luanne Ramsey is a 24 year old female who presents for Irregular bleeding.    LMP: 04/04/24.    Menses regular: IUD.    Menstrual flow normal: Spotting.    Birth control or HRT: Mirena IUD.   Refill no  Last Pap Smear: 08/29/23 . Any history of abnormal paps: no   Gardasil:(age 9-44 y/o) Completed.   Any medication refills needed today?: no    Problems/concerns: patient reports she has been light bleeding/ spotting since last Thursday 04/04. Was Sexually active the night before and she noticed the blood when she woke up.  This has never happened since having the Mirena IUD placed.      Next Appt:         Immunization History   Administered Date(s) Administered    DTAP INFANRIX 04/25/2000, 06/20/2000, 08/22/2000, 08/20/2001, 02/27/2003, 04/10/2003, 06/16/2003, 03/19/2005    HEP B, Ped/Adol 03/21/2000, 04/25/2000, 08/22/2000    HIB 04/25/2000, 06/20/2000, 08/22/2000, 06/11/2001    Hep B, Unspecified Formulation 03/21/2000, 04/25/2000, 08/22/2000, 02/27/2003, 06/16/2003    Hib, Unspecified Formulation 04/25/2000, 06/20/2000, 08/22/2000, 06/11/2001, 02/27/2003, 04/10/2003, 06/16/2003    Hpv Virus Vaccine 9 Angie Im 07/11/2017, 09/14/2017, 01/11/2018    IPV 04/25/2000, 06/20/2000, 11/14/2000, 02/27/2003, 04/10/2003, 01/30/2005    MMR 06/11/2001, 03/19/2005    Meningococcal-Menactra 07/11/2017    Pneumococcal (Prevnar 13) 08/20/2001    Pneumococcal (Prevnar 7) 08/20/2001, 02/27/2003, 04/10/2003, 06/16/2003    RHO(D) Immune Globulin 03/21/2000, 04/25/2000, 08/22/2000, 02/27/2003, 06/16/2003    TDAP 07/30/2011, 11/16/2022    Varicella 08/20/2001, 07/30/2011         Current Outpatient Medications:     Adapalene 0.3 % External Gel, Apply topically nightly., Disp: , Rfl:     ketoconazole 2 % External Shampoo, Apply 1 Application topically 3 (three) times a week., Disp: , Rfl:     Phentermine HCl 37.5 MG Oral Cap, Take 1 capsule (37.5 mg total) by  mouth every morning., Disp: 30 capsule, Rfl: 1    SUMAtriptan Succinate 100 MG Oral Tab, Take 1 tablet (100 mg total) by mouth every 2 (two) hours as needed., Disp: , Rfl:     Dapsone 5 % External Gel, Apply topically., Disp: , Rfl:     tretinoin 0.025 % External Cream, Apply topically., Disp: , Rfl:     Allergies   Allergen Reactions    Amoxicillin RASH    [place]born HIVES    Pecan Nut (Diagnostic) SWELLING    Prunus Persica HIVES    Walnuts SWELLING       OB History    Para Term  AB Living   0 0 0 0 0 0   SAB IAB Ectopic Multiple Live Births   0 0 0 0 0       Past Medical History:    Abnormal uterine bleeding    Allergic rhinitis    Amenorrhea    due ti IUD    Anxiety    Migraine       Past Surgical History:   Procedure Laterality Date    Cyst removal  2020    Insert intrauterine device  2020    Oophorectomy Left     Salpingectomy Left        Family History   Problem Relation Age of Onset    Hypertension Father     Breast Cancer Mother 43        Triple Negative Breast Cancer    Cancer Mother         Triple Negative Breast Cancer    Breast Cancer Maternal Grandmother 80        Tobacco  Allergies  Meds  Med Hx  Surg Hx  Fam Hx  Soc Hx        Social History     Socioeconomic History    Marital status: Single     Spouse name: Not on file    Number of children: Not on file    Years of education: Not on file    Highest education level: Not on file   Occupational History    Not on file   Tobacco Use    Smoking status: Never    Smokeless tobacco: Never    Tobacco comments:     8 months of vape use only   Substance and Sexual Activity    Alcohol use: Yes     Alcohol/week: 1.0 standard drink of alcohol     Types: 1 Standard drinks or equivalent per week     Comment: Social    Drug use: Never    Sexual activity: Not on file   Other Topics Concern    Caffeine Concern Not Asked    Exercise Not Asked    Seat Belt Not Asked    Special Diet Not Asked    Stress Concern Not Asked    Weight Concern Not  Asked   Social History Narrative    Not on file     Social Determinants of Health     Financial Resource Strain: Low Risk  (11/16/2022)    [facility], [facility]    Overall Financial Resource Strain (CARDIA)     Difficulty of Paying Living Expenses: Not hard at all   Food Insecurity: No Food Insecurity (11/16/2022)    Received from [facility], [facility]    Hunger Vital Sign     Worried About Running Out of Food in the Last Year: Never true     Ran Out of Food in the Last Year: Never true   Transportation Needs: No Transportation Needs (11/16/2022)    Received from [facility], [facility]    PRAPARE - Transportation     Lack of Transportation (Medical): No     Lack of Transportation (Non-Medical): No   Physical Activity: Not on file   Stress: Not on file   Social Connections: Not on file   Housing Stability: Not on file   /68   Ht 5' 3\" (1.6 m)   Wt 202 lb 2.6 oz (91.7 kg)   LMP 04/04/2024     Wt Readings from Last 3 Encounters:   04/11/24 202 lb 2.6 oz (91.7 kg)   03/04/24 206 lb 6.4 oz (93.6 kg)   01/02/24 219 lb 12.8 oz (99.7 kg)       Health Maintenance   Topic Date Due    COVID-19 Vaccine (1 - 2023-24 season) 09/26/2024 (Originally 9/1/2023)    Influenza Vaccine (Season Ended) 11/02/2024 (Originally 10/1/2024)    Annual Physical  08/29/2024    Pap Smear  07/27/2026    DTaP,Tdap,and Td Vaccines (8 - Td or Tdap) 11/16/2032    Annual Depression Screening  Completed    HPV Vaccines  Completed    Pneumococcal Vaccine: Birth to 64yrs  Aged Out         Review of Systems   General: Present- Feeling well. Not Present- Fever.  Female Genitourinary: Not Present- Dysmenorrhea, Dyspareunia, Flank Pain, Frequency, Menstrual Irregularities, Pelvic Pain, Urgency, Urinary Complaints, Vaginal Bleeding and Vaginal dryness.  Pain: Present- Pain Rating - 0 on a 0-10 scale.  All  other systems negative       Physical Exam   The physical exam findings are as follows:     Abdomen   Inspection: - Inspection Normal.  Palpation/Percussion: Palpation and Percussion of the abdomen reveal - Non Tender, No hepatosplenomegaly and No Palpable abdominal masses.    Female Genitourinary     External Genitalia   Perineum - Normal. Bartholin's Gland - Bilateral - Normal. Clitoris - Normal.  Introitus: Characteristics - Normal. Discharge - None.  Labia Majora: Lesions - Bilateral - None. Characteristics - Bilateral - Normal.  Labia Minora: Lesions - Bilateral - None. Characteristics - Bilateral - Normal.  Urethra: Characteristics - Normal. Discharge - None.  McCausland Gland - Bilateral - Normal.  Vulva: Characteristics - Normal. Lesions - None.    Speculum & Bimanual   Vagina:   Vaginal Wall: - Normal.  Vaginal Lesions - None. Vaginal Mucosa - Normal.  Cervix: Characteristics - No Motion tenderness. Discharge - None. IUD strings at os   Uterus: Characteristics - Non Tender. Position - Midposition.  Adnexa: Characteristics - Bilateral - Tender. Masses - No Adnexal Masses.  Bladder - Normal.    Rectal   Anorectal Exam: External - normal external exam.    Lymphatic  General Lymphatics   Description - Normal .    Plan for NSAIDs to address BTB with IUD if no improvement will proceed with Doxy - IUD feels appropriately placed today     1. Breakthrough bleeding associated with intrauterine device (IUD)

## 2024-05-06 ENCOUNTER — OFFICE VISIT (OUTPATIENT)
Dept: INTERNAL MEDICINE CLINIC | Facility: CLINIC | Age: 24
End: 2024-05-06
Payer: COMMERCIAL

## 2024-05-06 VITALS
DIASTOLIC BLOOD PRESSURE: 72 MMHG | WEIGHT: 199.81 LBS | SYSTOLIC BLOOD PRESSURE: 106 MMHG | BODY MASS INDEX: 35.4 KG/M2 | HEART RATE: 102 BPM | HEIGHT: 63 IN | OXYGEN SATURATION: 100 %

## 2024-05-06 DIAGNOSIS — E66.01 OBESITY, CLASS III, BMI 40-49.9 (MORBID OBESITY) (HCC): ICD-10-CM

## 2024-05-06 DIAGNOSIS — G43.109 MIGRAINE WITH AURA AND WITHOUT STATUS MIGRAINOSUS, NOT INTRACTABLE: Primary | ICD-10-CM

## 2024-05-06 DIAGNOSIS — F34.1 DYSTHYMIC DISORDER: ICD-10-CM

## 2024-05-06 DIAGNOSIS — Z51.81 THERAPEUTIC DRUG MONITORING: ICD-10-CM

## 2024-05-06 DIAGNOSIS — E55.9 VITAMIN D DEFICIENCY: ICD-10-CM

## 2024-05-06 PROCEDURE — 99214 OFFICE O/P EST MOD 30 MIN: CPT | Performed by: INTERNAL MEDICINE

## 2024-05-06 PROCEDURE — 3008F BODY MASS INDEX DOCD: CPT | Performed by: INTERNAL MEDICINE

## 2024-05-06 PROCEDURE — 3074F SYST BP LT 130 MM HG: CPT | Performed by: INTERNAL MEDICINE

## 2024-05-06 PROCEDURE — 3078F DIAST BP <80 MM HG: CPT | Performed by: INTERNAL MEDICINE

## 2024-05-06 RX ORDER — PHENTERMINE HYDROCHLORIDE 37.5 MG/1
37.5 CAPSULE ORAL EVERY MORNING
Qty: 30 CAPSULE | Refills: 1 | Status: SHIPPED | OUTPATIENT
Start: 2024-05-06

## 2024-05-06 RX ORDER — SUMATRIPTAN 100 MG/1
100 TABLET, FILM COATED ORAL EVERY 2 HOUR PRN
Qty: 30 TABLET | Refills: 1 | Status: SHIPPED | OUTPATIENT
Start: 2024-05-06

## 2024-05-06 RX ORDER — BUPROPION HYDROCHLORIDE 150 MG/1
150 TABLET ORAL DAILY
Qty: 30 TABLET | Refills: 1 | Status: SHIPPED | OUTPATIENT
Start: 2024-05-06

## 2024-05-06 RX ORDER — ONDANSETRON 4 MG/1
4 TABLET, FILM COATED ORAL EVERY 8 HOURS PRN
Qty: 30 TABLET | Refills: 0 | Status: SHIPPED | OUTPATIENT
Start: 2024-05-06 | End: 2024-06-05

## 2024-05-06 NOTE — PROGRESS NOTES
CC:   Chief Complaint   Patient presents with    Follow - Up     Weight loss       Reason for medical consultation: Medical Management of Weight and Weight related comorbidities.      HPI:   Food noise around mid-day    Initial weight: 240 lbs 8/2023  Current weight: 199 lbs  Interval weight loss: 10 lbs  Total weight loss: 41 lbs    Body mass index is 35.39 kg/m².   Wt Readings from Last 6 Encounters:   05/06/24 199 lb 12.8 oz (90.6 kg)   04/11/24 202 lb 2.6 oz (91.7 kg)   03/04/24 206 lb 6.4 oz (93.6 kg)   01/02/24 219 lb 12.8 oz (99.7 kg)   11/02/23 226 lb 6.4 oz (102.7 kg)   09/26/23 228 lb 12.8 oz (103.8 kg)     /72   Pulse 102   Ht 5' 3\" (1.6 m)   Wt 199 lb 12.8 oz (90.6 kg)   LMP 04/04/2024   SpO2 100%   BMI 35.39 kg/m²     LABS and RESULTS:     Office Visit on 07/27/2023   Component Date Value    Chlamydia Trachomatis Am* 07/27/2023 Negative     Neisseria Gonorrhoeae Am* 07/27/2023 Negative     Chlam/GC Source 07/27/2023 Cervical/endocervical     Thin Prep Pap 07/27/2023 AP TEST REFLEXED     Interpretation/Result 07/27/2023 Negative for intraepithelial lesion or malignancy     Specimen Adequacy 07/27/2023 Satisfactory for evaluation. No endocervical or metaplastic cells seen     General Categorization 07/27/2023 Negative for intraepithelial lesion or malignancy     HPV High Risk mRNA 07/27/2023                      Value:This result contains rich text formatting which cannot be displayed here.    Recommendations/Comments 07/27/2023                      Value:This result contains rich text formatting which cannot be displayed here.    Procedure 07/27/2023                      Value:This result contains rich text formatting which cannot be displayed here.    Clinical Information 07/27/2023                      Value:This result contains rich text formatting which cannot be displayed here.    Reason for testing 07/27/2023 Screening     Gyn Additional Informati* 07/27/2023                       Value:This result contains rich text formatting which cannot be displayed here.    Case Report 07/27/2023                      Value:Gynecologic Cytology                              Case: [id number]                                  Authorizing Provider:  Becky Romo MD     Collected:           07/27/2023 07:15 PM          Ordering Location:     [facility]    Received:            07/31/2023 10:02 AM                                 Group, [address] [place] [address]                                                                    First Screen:          Lucía Man                                                               Specimen:    ThinPrep Imager Screening Pap, Cervical/endocervical                                       HPV High Risk 07/27/2023 Negative     HPV Source 07/27/2023 Cervical/endocervical        Current Outpatient Medications   Medication Sig Dispense Refill    Phentermine HCl 37.5 MG Oral Cap Take 1 capsule (37.5 mg total) by mouth every morning. 30 capsule 1    buPROPion  MG Oral Tablet 24 Hr Take 1 tablet (150 mg total) by mouth daily. 30 tablet 1    SUMAtriptan Succinate 100 MG Oral Tab Take 1 tablet (100 mg total) by mouth every 2 (two) hours as needed for Migraine. 30 tablet 1    Adapalene 0.3 % External Gel Apply topically nightly.      ketoconazole 2 % External Shampoo Apply 1 Application topically 3 (three) times a week.      Dapsone 5 % External Gel Apply topically.      tretinoin 0.025 % External Cream Apply topically.        Past Medical History:    Abnormal uterine bleeding    Allergic rhinitis    Amenorrhea    due ti IUD    Anxiety    Migraine       Past Surgical History:   Procedure Laterality Date    Cyst removal  june 2020    Insert intrauterine device  November 2020    Oophorectomy Left     Salpingectomy Left        Social History:  Social History     Socioeconomic History     Marital status: Single   Tobacco Use    Smoking status: Never    Smokeless tobacco: Never    Tobacco comments:     8 months of vape use only   Substance and Sexual Activity    Alcohol use: Yes     Alcohol/week: 1.0 standard drink of alcohol     Types: 1 Standard drinks or equivalent per week     Comment: Social    Drug use: Never     Social Determinants of Health     Financial Resource Strain: Low Risk  (11/16/2022)    [facility] from [facility], [facility]    Overall Financial Resource Strain (CARDIA)     Difficulty of Paying Living Expenses: Not hard at all   Food Insecurity: No Food Insecurity (11/16/2022)    Received from [facility], [facility]    Hunger Vital Sign     Worried About Running Out of Food in the Last Year: Never true     Ran Out of Food in the Last Year: Never true   Transportation Needs: No Transportation Needs (11/16/2022)    Received from [facility], [facility]    PRAPARE - Transportation     Lack of Transportation (Medical): No     Lack of Transportation (Non-Medical): No     Family History:  Family History   Problem Relation Age of Onset    Hypertension Father     Breast Cancer Mother 43        Triple Negative Breast Cancer    Cancer Mother         Triple Negative Breast Cancer    Breast Cancer Maternal Grandmother 80          REVIEW OF SYSTEMS:   10 point review of systems otherwise negative.  With the exception of HPI and assessment and plan        EXAM:   /72   Pulse 102   Ht 5' 3\" (1.6 m)   Wt 199 lb 12.8 oz (90.6 kg)   LMP 04/04/2024   SpO2 100%   BMI 35.39 kg/m²   Body mass index is 35.39 kg/m².     GENERAL: NAD, conversant  SKIN: good turgor, no jaundice  HEENT: no scleral icterus,conjunctiva are clear, oropharynx clear, nl external nose and ears  NECK: supple, no carotid bruits, thyroid normal  LYMPH: no cervical LAD, no supraclavicular  LAD  LUNGS: nl effort, clear to auscultation bilaterally  CARDIO: RRR, no murmur  ABDOMEN: NT/ND, no masses, no HSM   EXTREMITIES: gait normal, no edema   PSYCH: Oriented times three, appropriate affect    ASSESSMENT AND PLAN:     Nutritional Counseling: educate, track, dietician    1. Obesity, Class III, BMI 40-49.9 (morbid obesity) ([facility])  2. Vitamin D deficiency  3. Migraine with aura and without status migrainosus, not intractable  4. Therapeutic drug monitoring  5. Dysthymic disorder    -Initial weight 240, BMI 42.5  -failed diet and exercise program >6 months  -denies any hx of CAD, htn, kidney stones or seizure  -Topiramate tried and not tolerated due to numbness in fingers  -PHENTERMINE: Since the patient would like to try phentermine, and is aware of the potential side effects (hypertension, palpitations, tachycardia, and anxiety), I will give her a prescription today to be used in conjunction with the above diet and exercise program. The patient will check heart rate and blood pressure on a regular basis. Patient will call me if the BP goes over 140/90 or has palpitations or racing heart rate. They understands that I will not call in the prescription and an appointment is needed to have the medication refilled.   -EKG 8/2023 NSR, possible LAE  -labs 8/2023 vit D deficiency, normal cbc, TSH, cmp  - Echo 12/2023 wnl    Plan:  - Phentermine HCl 37.5 MG Oral Cap; Take 1 capsule (37.5 mg total) by mouth every morning.  Dispense: 30 capsule; Refill: 1  -start wellbutrin for emotional eating  -sumatriptan PRN for migraine attacks  -going to therapy with mental health.   -Discussed Kcal goals <1800 Kcal, carbohydrate goal <100 grams  -Exercise goal 1 hour daily  -Stress management 10 minutes meditation before bedtime  -Drink at least 64 oz water daily      Regarding Obesity:  Consulted on nutrition and activity  Advised portion-controlled/low carb diet <100 g daily.  Discussed sleep and stress  management.    Regarding weight loss medications.  I've discussed with patients the risks and benefits of such weight loss medications.  That these are treatments to be used in conjunction with diet and exercise for promoting health and weight loss.                No follow-ups on file.       Suzanne Moreno MD

## 2024-06-27 ENCOUNTER — OFFICE VISIT (OUTPATIENT)
Dept: INTERNAL MEDICINE CLINIC | Facility: CLINIC | Age: 24
End: 2024-06-27

## 2024-06-27 VITALS
RESPIRATION RATE: 16 BRPM | HEART RATE: 74 BPM | BODY MASS INDEX: 33.31 KG/M2 | WEIGHT: 188 LBS | DIASTOLIC BLOOD PRESSURE: 77 MMHG | SYSTOLIC BLOOD PRESSURE: 115 MMHG | HEIGHT: 63 IN

## 2024-06-27 DIAGNOSIS — F34.1 DYSTHYMIC DISORDER: ICD-10-CM

## 2024-06-27 DIAGNOSIS — E55.9 VITAMIN D DEFICIENCY: ICD-10-CM

## 2024-06-27 DIAGNOSIS — Z51.81 THERAPEUTIC DRUG MONITORING: ICD-10-CM

## 2024-06-27 DIAGNOSIS — G43.109 MIGRAINE WITH AURA AND WITHOUT STATUS MIGRAINOSUS, NOT INTRACTABLE: Primary | ICD-10-CM

## 2024-06-27 DIAGNOSIS — E66.01 OBESITY, CLASS III, BMI 40-49.9 (MORBID OBESITY) (HCC): ICD-10-CM

## 2024-06-27 PROCEDURE — 99214 OFFICE O/P EST MOD 30 MIN: CPT | Performed by: INTERNAL MEDICINE

## 2024-06-27 PROCEDURE — 3008F BODY MASS INDEX DOCD: CPT | Performed by: INTERNAL MEDICINE

## 2024-06-27 PROCEDURE — 3078F DIAST BP <80 MM HG: CPT | Performed by: INTERNAL MEDICINE

## 2024-06-27 PROCEDURE — 3074F SYST BP LT 130 MM HG: CPT | Performed by: INTERNAL MEDICINE

## 2024-06-27 RX ORDER — PHENTERMINE HYDROCHLORIDE 37.5 MG/1
37.5 CAPSULE ORAL EVERY MORNING
Qty: 30 CAPSULE | Refills: 1 | Status: SHIPPED | OUTPATIENT
Start: 2024-06-27

## 2024-06-27 RX ORDER — BUPROPION HYDROCHLORIDE 150 MG/1
150 TABLET ORAL DAILY
Qty: 30 TABLET | Refills: 1 | Status: SHIPPED | OUTPATIENT
Start: 2024-06-27

## 2024-06-27 RX ORDER — ISOTRETINOIN 20 MG/1
20 CAPSULE ORAL DAILY
COMMUNITY

## 2024-06-27 NOTE — PROGRESS NOTES
CC:   Chief Complaint   Patient presents with    Weight Check      Reason for medical consultation: Medical Management of Weight and Weight related comorbidities.      HPI:   Feels wellbutrin has really helped her with emotional eating and anxiety/depression    Initial weight: 240 lbs 8/2023  Current weight: 188 lbs  Interval weight loss: 11 lbs  Total weight loss: 52 lbs    Body mass index is 33.3 kg/m².   Wt Readings from Last 6 Encounters:   06/27/24 188 lb (85.3 kg)   05/06/24 199 lb 12.8 oz (90.6 kg)   04/11/24 202 lb 2.6 oz (91.7 kg)   03/04/24 206 lb 6.4 oz (93.6 kg)   01/02/24 219 lb 12.8 oz (99.7 kg)   11/02/23 226 lb 6.4 oz (102.7 kg)     /77   Pulse 74   Resp 16   Ht 5' 3\" (1.6 m)   Wt 188 lb (85.3 kg)   LMP 04/04/2024   BMI 33.30 kg/m²     LABS and RESULTS:     Office Visit on 07/27/2023   Component Date Value    Chlamydia Trachomatis Am* 07/27/2023 Negative     Neisseria Gonorrhoeae Am* 07/27/2023 Negative     Chlam/GC Source 07/27/2023 Cervical/endocervical     Thin Prep Pap 07/27/2023 AP TEST REFLEXED     Interpretation/Result 07/27/2023 Negative for intraepithelial lesion or malignancy     Specimen Adequacy 07/27/2023 Satisfactory for evaluation. No endocervical or metaplastic cells seen     General Categorization 07/27/2023 Negative for intraepithelial lesion or malignancy     HPV High Risk mRNA 07/27/2023                      Value:This result contains rich text formatting which cannot be displayed here.    Recommendations/Comments 07/27/2023                      Value:This result contains rich text formatting which cannot be displayed here.    Procedure 07/27/2023                      Value:This result contains rich text formatting which cannot be displayed here.    Clinical Information 07/27/2023                      Value:This result contains rich text formatting which cannot be displayed here.    Reason for testing 07/27/2023 Screening     Gyn Additional Informati* 07/27/2023                       Value:This result contains rich text formatting which cannot be displayed here.    Case Report 07/27/2023                      Value:Gynecologic Cytology                              Case: [id number]                                  Authorizing Provider:  Becky Romo MD     Collected:           07/27/2023 07:15 PM          Ordering Location:     [facility]    Received:            07/31/2023 10:02 AM                                 Group, [address] [place] [address]                                                                    First Screen:          Lucía Man                                                               Specimen:    ThinPrep Imager Screening Pap, Cervical/endocervical                                       HPV High Risk 07/27/2023 Negative     HPV Source 07/27/2023 Cervical/endocervical        Current Outpatient Medications   Medication Sig Dispense Refill    Isotretinoin (ACCUTANE) 20 MG Oral Cap Take 1 capsule (20 mg total) by mouth daily.      Phentermine HCl 37.5 MG Oral Cap Take 1 capsule (37.5 mg total) by mouth every morning. 30 capsule 1    buPROPion  MG Oral Tablet 24 Hr Take 1 tablet (150 mg total) by mouth daily. 30 tablet 1    SUMAtriptan Succinate 100 MG Oral Tab Take 1 tablet (100 mg total) by mouth every 2 (two) hours as needed for Migraine. 30 tablet 1    Adapalene 0.3 % External Gel Apply topically nightly.      ketoconazole 2 % External Shampoo Apply 1 Application topically 3 (three) times a week.      Dapsone 5 % External Gel Apply topically.      tretinoin 0.025 % External Cream Apply topically.        Past Medical History:    Abnormal uterine bleeding    Allergic rhinitis    Amenorrhea    due ti IUD    Anxiety    Migraine       Past Surgical History:   Procedure Laterality Date    Cyst removal  june 2020    Insert intrauterine device  November 2020     Oophorectomy Left     Salpingectomy Left        Social History:  Social History     Socioeconomic History    Marital status: Single   Tobacco Use    Smoking status: Never    Smokeless tobacco: Never    Tobacco comments:     8 months of vape use only   Substance and Sexual Activity    Alcohol use: Yes     Alcohol/week: 1.0 standard drink of alcohol     Types: 1 Standard drinks or equivalent per week     Comment: Social    Drug use: Never     Social Determinants of Health     Financial Resource Strain: Low Risk  (11/16/2022)    [facility], [facility]    Overall Financial Resource Strain (CARDIA)     Difficulty of Paying Living Expenses: Not hard at all   Food Insecurity: No Food Insecurity (11/16/2022)    [facility] from [facility], [facility]    Hunger Vital Sign     Worried About Running Out of Food in the Last Year: Never true     Ran Out of Food in the Last Year: Never true   Transportation Needs: No Transportation Needs (11/16/2022)    Received from [facility], [facility]    PRAPARE - Transportation     Lack of Transportation (Medical): No     Lack of Transportation (Non-Medical): No     Family History:  Family History   Problem Relation Age of Onset    Hypertension Father     Breast Cancer Mother 43        Triple Negative Breast Cancer    Cancer Mother         Triple Negative Breast Cancer    Breast Cancer Maternal Grandmother 80          REVIEW OF SYSTEMS:   10 point review of systems otherwise negative.  With the exception of HPI and assessment and plan        EXAM:   /77   Pulse 74   Resp 16   Ht 5' 3\" (1.6 m)   Wt 188 lb (85.3 kg)   LMP 04/04/2024   BMI 33.30 kg/m²   Body mass index is 33.3 kg/m².     GENERAL: NAD, conversant  SKIN: good turgor, no jaundice  HEENT: no scleral icterus,conjunctiva are clear, oropharynx clear, nl external nose and  ears  NECK: supple, no carotid bruits, thyroid normal  LYMPH: no cervical LAD, no supraclavicular LAD  LUNGS: nl effort, clear to auscultation bilaterally  CARDIO: RRR, no murmur  ABDOMEN: NT/ND, no masses, no HSM   EXTREMITIES: gait normal, no edema   PSYCH: Oriented times three, appropriate affect    ASSESSMENT AND PLAN:     Nutritional Counseling: educate, track, dietician    1. Obesity, Class III, BMI 40-49.9 (morbid obesity) ([facility])  2. Vitamin D deficiency  3. Migraine with aura and without status migrainosus, not intractable  4. Therapeutic drug monitoring  5. Dysthymic disorder    -Initial weight 240, BMI 42.5  -failed diet and exercise program >6 months  -denies any hx of CAD, htn, kidney stones or seizure  -Topiramate tried and not tolerated due to numbness in fingers  -PHENTERMINE: Since the patient would like to try phentermine, and is aware of the potential side effects (hypertension, palpitations, tachycardia, and anxiety), I will give her a prescription today to be used in conjunction with the above diet and exercise program. The patient will check heart rate and blood pressure on a regular basis. Patient will call me if the BP goes over 140/90 or has palpitations or racing heart rate. They understands that I will not call in the prescription and an appointment is needed to have the medication refilled.   -EKG 8/2023 NSR, possible LAE  -labs 8/2023 vit D deficiency, normal cbc, TSH, cmp  - Echo 12/2023 wnl    Plan:  -Continue Phentermine HCl 37.5 MG Oral Cap; Take 1 capsule (37.5 mg total) by mouth every morning.  Dispense: 30 capsule; Refill: 1  -continue wellbutrin for emotional eating  -sumatriptan PRN for migraine attacks  -going to therapy with mental health.   -Discussed Kcal goals <1800 Kcal, carbohydrate goal <100 grams  -Exercise goal 1 hour daily  -Stress management 10 minutes meditation before bedtime  -Drink at least 64 oz water daily      Regarding Obesity:  Consulted on nutrition and  activity  Advised portion-controlled/low carb diet <100 g daily.  Discussed sleep and stress management.    Regarding weight loss medications.  I've discussed with patients the risks and benefits of such weight loss medications.  That these are treatments to be used in conjunction with diet and exercise for promoting health and weight loss.                No follow-ups on file.       Suzanne Moreno MD

## 2024-08-19 ENCOUNTER — OFFICE VISIT (OUTPATIENT)
Dept: OBGYN CLINIC | Facility: CLINIC | Age: 24
End: 2024-08-19

## 2024-08-19 VITALS
HEIGHT: 63 IN | HEART RATE: 114 BPM | DIASTOLIC BLOOD PRESSURE: 69 MMHG | BODY MASS INDEX: 33.13 KG/M2 | SYSTOLIC BLOOD PRESSURE: 110 MMHG | WEIGHT: 187 LBS

## 2024-08-19 DIAGNOSIS — Z13.220 SCREENING, LIPID: ICD-10-CM

## 2024-08-19 DIAGNOSIS — Z01.419 WELL WOMAN EXAM WITH ROUTINE GYNECOLOGICAL EXAM: Primary | ICD-10-CM

## 2024-08-19 DIAGNOSIS — Z13.0 SCREENING, IRON DEFICIENCY ANEMIA: ICD-10-CM

## 2024-08-19 DIAGNOSIS — Z13.29 THYROID DISORDER SCREENING: ICD-10-CM

## 2024-08-19 DIAGNOSIS — Z11.3 SCREEN FOR STD (SEXUALLY TRANSMITTED DISEASE): ICD-10-CM

## 2024-08-19 DIAGNOSIS — Z30.431 IUD CHECK UP: ICD-10-CM

## 2024-08-19 DIAGNOSIS — Z13.1 SCREENING FOR DIABETES MELLITUS (DM): ICD-10-CM

## 2024-08-19 PROCEDURE — 3074F SYST BP LT 130 MM HG: CPT | Performed by: OBSTETRICS & GYNECOLOGY

## 2024-08-19 PROCEDURE — 3078F DIAST BP <80 MM HG: CPT | Performed by: OBSTETRICS & GYNECOLOGY

## 2024-08-19 PROCEDURE — 99395 PREV VISIT EST AGE 18-39: CPT | Performed by: OBSTETRICS & GYNECOLOGY

## 2024-08-19 PROCEDURE — 3008F BODY MASS INDEX DOCD: CPT | Performed by: OBSTETRICS & GYNECOLOGY

## 2024-08-19 RX ORDER — VALACYCLOVIR HYDROCHLORIDE 1 G/1
TABLET, FILM COATED ORAL
COMMUNITY
Start: 2024-08-13

## 2024-08-19 RX ORDER — PREDNISONE 20 MG/1
20 TABLET ORAL EVERY MORNING
COMMUNITY
Start: 2024-06-05

## 2024-08-19 NOTE — PROGRESS NOTES
[facility]  Obstetrics and Gynecology  Gynecology Visit    Chief Complaint   Patient presents with    Annual           Luanne Ramsey is a 24 year old female who presents for Annual .    LMP: UNKNOWN IUD .    Menses regular:  IUD .    Menstrual flow normal:  IUD     Birth control or HRT:   none   Refill none  Last Pap Smear: 07/27/23.  Any history of abnormal paps:  no    Last MMG: underage  Any Medication Refills needed today?:  none  Sleep: 6-7 hours.    Diet: no but better choices .    Exercise: 4-5 days ever week.   Screening labs/Blood work today:  annual blood work.     Colonoscopy (if over 44 y/o):underage  .   Gardasil:(age 9-44 y/o) up to date.   Genetic Cancer screen (if indicated):  n/a.   Flu (Aug-April):  n/a.TDAP (every 10 years)  up to date.      Additional Problems/concerns: none .      Next Appt: 08/18/2025     Immunization History   Administered Date(s) Administered    DTAP 04/25/2000, 06/20/2000, 08/22/2000, 08/20/2001, 02/27/2003, 04/10/2003, 06/16/2003, 03/19/2005    HEP B, Ped/Adol 03/21/2000, 04/25/2000, 08/22/2000    HIB PRP-T 04/25/2000, 06/20/2000, 08/22/2000, 06/11/2001    Hep B, Unspecified Formulation 03/21/2000, 04/25/2000, 08/22/2000, 02/27/2003, 06/16/2003    Hib, Unspecified Formulation 04/25/2000, 06/20/2000, 08/22/2000, 06/11/2001, 02/27/2003, 04/10/2003, 06/16/2003    Hpv Virus Vaccine 9 Angie Im 07/11/2017, 09/14/2017, 01/11/2018    IPV 04/25/2000, 06/20/2000, 11/14/2000, 02/27/2003, 04/10/2003, 01/30/2005    MMR 06/11/2001, 03/19/2005    Meningococcal-Menactra 07/11/2017    Pneumococcal (Prevnar 13) 08/20/2001    Pneumococcal (Prevnar 7) 08/20/2001, 02/27/2003, 04/10/2003, 06/16/2003    RHO(D) Immune Globulin 03/21/2000, 04/25/2000, 08/22/2000, 02/27/2003, 06/16/2003    TDAP 07/30/2011, 11/16/2022    Varicella 08/20/2001, 07/30/2011         Current Outpatient Medications:     hydrocortisone 2.5 % External Cream, apply to neck twice a day for 1 week, Disp: , Rfl:      predniSONE 20 MG Oral Tab, Take 1 tablet (20 mg total) by mouth every morning., Disp: , Rfl:     valACYclovir 1 G Oral Tab, , Disp: , Rfl:     Isotretinoin (ACCUTANE) 20 MG Oral Cap, Take 1 capsule (20 mg total) by mouth daily., Disp: , Rfl:     buPROPion  MG Oral Tablet 24 Hr, Take 1 tablet (150 mg total) by mouth daily., Disp: 30 tablet, Rfl: 1    Phentermine HCl 37.5 MG Oral Cap, Take 1 capsule (37.5 mg total) by mouth every morning., Disp: 30 capsule, Rfl: 1    SUMAtriptan Succinate 100 MG Oral Tab, Take 1 tablet (100 mg total) by mouth every 2 (two) hours as needed for Migraine., Disp: 30 tablet, Rfl: 1    Adapalene 0.3 % External Gel, Apply topically nightly., Disp: , Rfl:     ketoconazole 2 % External Shampoo, Apply 1 Application topically 3 (three) times a week., Disp: , Rfl:     Dapsone 5 % External Gel, Apply topically., Disp: , Rfl:     tretinoin 0.025 % External Cream, Apply topically., Disp: , Rfl:     Allergies   Allergen Reactions    Amoxicillin RASH    Davie HIVES    Pecan Nut (Diagnostic) SWELLING    Prunus Persica HIVES    Walnuts SWELLING       OB History    Para Term  AB Living   0 0 0 0 0 0   SAB IAB Ectopic Multiple Live Births   0 0 0 0 0       Past Medical History:    Abnormal uterine bleeding    Allergic rhinitis    Amenorrhea    due ti IUD    Anxiety    Migraine       Past Surgical History:   Procedure Laterality Date    Cyst removal  2020    Insert intrauterine device  2020    Oophorectomy Left     Salpingectomy Left        Family History   Problem Relation Age of Onset    Hypertension Father     Breast Cancer Mother 43        Triple Negative Breast Cancer    Cancer Mother         Triple Negative Breast Cancer    Breast Cancer Maternal Grandmother 80        Tobacco  Allergies         Social History     Socioeconomic History    Marital status: Single     Spouse name: Not on file    Number of children: Not on file    Years of education: Not on file     Highest education level: Not on file   Occupational History    Not on file   Tobacco Use    Smoking status: Never    Smokeless tobacco: Never    Tobacco comments:     8 months of vape use only   Substance and Sexual Activity    Alcohol use: Yes     Alcohol/week: 1.0 standard drink of alcohol     Types: 1 Standard drinks or equivalent per week     Comment: Social    Drug use: Never    Sexual activity: Not on file   Other Topics Concern    Caffeine Concern Not Asked    Exercise Not Asked    Seat Belt Not Asked    Special Diet Not Asked    Stress Concern Not Asked    Weight Concern Not Asked   Social History Narrative    Not on file     Social Determinants of Health     Financial Resource Strain: Low Risk  (11/16/2022)    [facility]    Overall Financial Resource Strain (CARDIA)     Difficulty of Paying Living Expenses: Not hard at all   Food Insecurity: No Food Insecurity (11/16/2022)    [facility]    Hunger Vital Sign     Worried About Running Out of Food in the Last Year: Never true     Ran Out of Food in the Last Year: Never true   Transportation Needs: No Transportation Needs (11/16/2022)    [facility]    PRAPARE - Transportation     Lack of Transportation (Medical): No     Lack of Transportation (Non-Medical): No   Physical Activity: Not on file   Stress: Not on file   Social Connections: Not on file   Housing Stability: Not on file     /69 (BP Location: Right arm, Patient Position: Sitting, Cuff Size: adult)   Pulse 114   Ht 5' 3\" (1.6 m)   Wt 187 lb (84.8 kg)   LMP 04/04/2024   BMI 33.13 kg/m²     Wt Readings from Last 3 Encounters:   08/19/24 187 lb (84.8 kg)   06/27/24 188 lb (85.3 kg)   05/06/24 199 lb 12.8 oz (90.6 kg)         Health Maintenance   Topic Date Due    Influenza Vaccine (1)  08/01/2021    Screen for Cervical Cancer 11/05/2021    DTaP,Tdap and Td Vaccines (3 - Td or Tdap) 03/18/2025    Hepatitis C Screening Completed    HIV Screening Completed    COVID-19 Vaccine Completed     Review of Systems   General: Present- Feeling well. Not Present- Chills, Fever, Weight Gain and Weight Loss.  HEENT: Not Present- Headache and Sore Throat.  Respiratory: Not Present- Cough, Difficulty Breathing, Hemoptysis and Sputum Production.  Cardiovascular: Not Present- Chest Pain, Elevated Blood Pressure, Fainting / Blacking Out and Shortness of Breath.  Gastrointestinal: Not Present- Constipation, Diarrhea, Nausea and Vomiting.  Female Genitourinary: Not Present- Discharge, Dysuria and Frequency.  Musculoskeletal: Not Present- Leg Cramps and Swelling of Extremities.  Neurological: Not Present- Dizziness and Headaches.  Psychiatric: Not Present- Anxiety and Depression.  Endocrine: Not Present- Appetite Changes, Hair Changes and Thyroid Problems.  Hematology: Not Present- Easy Bruising and Excessive bleeding.  All other systems negative     Physical Exam The physical exam findings are as follows:     General   Mental Status - Alert. General Appearance - Cooperative. Orientation - Oriented X4. Build & Nutrition - Well nourished.    Head and Neck  Thyroid   Gland Characteristics - normal size and consistency.    Chest and Lung Exam   Inspection:   Chest Wall: - Normal.  Percussion:   Quality and Intensity: - Percussion normal.  Palpation: - Palpation normal.  Auscultation:   Breath sounds: - Normal.  Adventitious sounds: - No Adventitious sounds.    Breast   Nipples: Characteristics - Bilateral - Normal. Discharge - Bilateral - None.  Breast - Bilateral - Symmetric.    Cardiovascular   Auscultation: Rhythm - Regular. Heart Sounds - Normal heart sounds.  Murmurs & Other Heart Sounds: Auscultation of the heart reveals - No Murmurs.    Abdomen   Inspection: Inspection of the abdomen reveals - No Hernias. Incisional  scars - no incisional scars.  Palpation/Percussion: Palpation and Percussion of the abdomen reveal - Non Tender and No Palpable abdominal masses.  Liver: - Normal.  Auscultation: Auscultation of the abdomen reveals - Bowel sounds normal.    Female Genitourinary     External Genitalia   Perineum - Normal. Bartholin's Gland - Bilateral - Normal. Clitoris - Normal.  Introitus: Characteristics - No Cystocele, Enterocele or Rectocele. Discharge - None.  Labia Majora: Lesions - Bilateral - None. Characteristics - Bilateral - Normal.  Labia Minora: Lesions - Bilateral - None. Characteristics - Bilateral - Normal.  Urethra: Characteristics - Normal. Discharge - None.  King Gland - Bilateral - Normal.  Vulva: Characteristics - Normal. Lesions - None.    Speculum & Bimanual   Vagina:   Vaginal Wall: - Normal.  Vaginal Lesions - None. Vaginal Mucosa - Normal.  Cervix: Characteristics - No Motion tenderness. Discharge - None. IUD at os   Uterus: Characteristics - Normal. Position - Midposition.  Adnexa: Characteristics - Bilateral - Normal. Masses - No Adnexal Masses.  Wet Mount: pH - 3.8-4.2. Vaginal discharge - Clear  and Thin. Amine Odor - Absent. Main patient complaints - Discharge. Microscopy - Lactobacilli and Epithelial cells.    Rectal   Anorectal Exam: External - normal external exam.    Peripheral Vascular   Upper Extremity:   Palpation: - Pulses bilaterally normal.  Lower Extremity: Inspection - Bilateral - Inspection Normal.  Palpation: Edema - Bilateral - No edema.    Neurologic   Mental Status: - Normal.    Lymphatic  General Lymphatics   Description - Normal .       1. Well woman exam with routine gynecological exam    2. Screen for STD (sexually transmitted disease)    3. IUD check up

## 2024-08-20 LAB
C TRACH DNA SPEC QL NAA+PROBE: NEGATIVE
N GONORRHOEA DNA SPEC QL NAA+PROBE: NEGATIVE

## 2024-08-26 ENCOUNTER — OFFICE VISIT (OUTPATIENT)
Dept: INTERNAL MEDICINE CLINIC | Facility: CLINIC | Age: 24
End: 2024-08-26

## 2024-08-26 ENCOUNTER — LAB ENCOUNTER (OUTPATIENT)
Dept: LAB | Age: 24
End: 2024-08-26
Attending: OBSTETRICS & GYNECOLOGY
Payer: COMMERCIAL

## 2024-08-26 VITALS
OXYGEN SATURATION: 99 % | WEIGHT: 182.38 LBS | HEART RATE: 93 BPM | HEIGHT: 63 IN | DIASTOLIC BLOOD PRESSURE: 72 MMHG | SYSTOLIC BLOOD PRESSURE: 106 MMHG | BODY MASS INDEX: 32.32 KG/M2

## 2024-08-26 DIAGNOSIS — F34.1 DYSTHYMIC DISORDER: ICD-10-CM

## 2024-08-26 DIAGNOSIS — Z13.0 SCREENING, IRON DEFICIENCY ANEMIA: ICD-10-CM

## 2024-08-26 DIAGNOSIS — Z13.29 THYROID DISORDER SCREENING: ICD-10-CM

## 2024-08-26 DIAGNOSIS — G43.109 MIGRAINE WITH AURA AND WITHOUT STATUS MIGRAINOSUS, NOT INTRACTABLE: Primary | ICD-10-CM

## 2024-08-26 DIAGNOSIS — E66.01 OBESITY, CLASS III, BMI 40-49.9 (MORBID OBESITY) (HCC): ICD-10-CM

## 2024-08-26 DIAGNOSIS — Z13.220 SCREENING, LIPID: ICD-10-CM

## 2024-08-26 DIAGNOSIS — Z13.1 SCREENING FOR DIABETES MELLITUS (DM): ICD-10-CM

## 2024-08-26 LAB
ALBUMIN SERPL-MCNC: 4.6 G/DL (ref 3.2–4.8)
ALBUMIN/GLOB SERPL: 1.4 {RATIO} (ref 1–2)
ALP LIVER SERPL-CCNC: 79 U/L
ALT SERPL-CCNC: 18 U/L
ANION GAP SERPL CALC-SCNC: 6 MMOL/L (ref 0–18)
AST SERPL-CCNC: 21 U/L (ref ?–34)
BASOPHILS # BLD AUTO: 0.03 X10(3) UL (ref 0–0.2)
BASOPHILS NFR BLD AUTO: 0.3 %
BILIRUB SERPL-MCNC: 0.5 MG/DL (ref 0.3–1.2)
BUN BLD-MCNC: 8 MG/DL (ref 9–23)
BUN/CREAT SERPL: 8.7 (ref 10–20)
CALCIUM BLD-MCNC: 9.8 MG/DL (ref 8.7–10.4)
CHLORIDE SERPL-SCNC: 107 MMOL/L (ref 98–112)
CHOLEST SERPL-MCNC: 175 MG/DL (ref ?–200)
CO2 SERPL-SCNC: 25 MMOL/L (ref 21–32)
CREAT BLD-MCNC: 0.92 MG/DL
DEPRECATED HBV CORE AB SER IA-ACNC: 143.9 NG/ML
DEPRECATED RDW RBC AUTO: 43.8 FL (ref 35.1–46.3)
EGFRCR SERPLBLD CKD-EPI 2021: 89 ML/MIN/1.73M2 (ref 60–?)
EOSINOPHIL # BLD AUTO: 0.05 X10(3) UL (ref 0–0.7)
EOSINOPHIL NFR BLD AUTO: 0.5 %
ERYTHROCYTE [DISTWIDTH] IN BLOOD BY AUTOMATED COUNT: 12.6 % (ref 11–15)
FASTING PATIENT LIPID ANSWER: NO
FASTING STATUS PATIENT QL REPORTED: NO
GLOBULIN PLAS-MCNC: 3.3 G/DL (ref 2–3.5)
GLUCOSE BLD-MCNC: 84 MG/DL (ref 70–99)
HCT VFR BLD AUTO: 44.6 %
HDLC SERPL-MCNC: 46 MG/DL (ref 40–59)
HGB BLD-MCNC: 14.8 G/DL
IMM GRANULOCYTES # BLD AUTO: 0.02 X10(3) UL (ref 0–1)
IMM GRANULOCYTES NFR BLD: 0.2 %
LDLC SERPL CALC-MCNC: 116 MG/DL (ref ?–100)
LYMPHOCYTES # BLD AUTO: 2.91 X10(3) UL (ref 1–4)
LYMPHOCYTES NFR BLD AUTO: 28.8 %
MCH RBC QN AUTO: 31.2 PG (ref 26–34)
MCHC RBC AUTO-ENTMCNC: 33.2 G/DL (ref 31–37)
MCV RBC AUTO: 94.1 FL
MONOCYTES # BLD AUTO: 0.54 X10(3) UL (ref 0.1–1)
MONOCYTES NFR BLD AUTO: 5.3 %
NEUTROPHILS # BLD AUTO: 6.57 X10 (3) UL (ref 1.5–7.7)
NEUTROPHILS # BLD AUTO: 6.57 X10(3) UL (ref 1.5–7.7)
NEUTROPHILS NFR BLD AUTO: 64.9 %
NONHDLC SERPL-MCNC: 129 MG/DL (ref ?–130)
OSMOLALITY SERPL CALC.SUM OF ELEC: 284 MOSM/KG (ref 275–295)
PLATELET # BLD AUTO: 366 10(3)UL (ref 150–450)
POTASSIUM SERPL-SCNC: 4.1 MMOL/L (ref 3.5–5.1)
PROT SERPL-MCNC: 7.9 G/DL (ref 5.7–8.2)
RBC # BLD AUTO: 4.74 X10(6)UL
SODIUM SERPL-SCNC: 138 MMOL/L (ref 136–145)
TRIGL SERPL-MCNC: 68 MG/DL (ref 30–149)
TSI SER-ACNC: 1.53 MIU/ML (ref 0.55–4.78)
VLDLC SERPL CALC-MCNC: 12 MG/DL (ref 0–30)
WBC # BLD AUTO: 10.1 X10(3) UL (ref 4–11)

## 2024-08-26 PROCEDURE — 99214 OFFICE O/P EST MOD 30 MIN: CPT | Performed by: INTERNAL MEDICINE

## 2024-08-26 PROCEDURE — 3074F SYST BP LT 130 MM HG: CPT | Performed by: INTERNAL MEDICINE

## 2024-08-26 PROCEDURE — 82728 ASSAY OF FERRITIN: CPT

## 2024-08-26 PROCEDURE — 3078F DIAST BP <80 MM HG: CPT | Performed by: INTERNAL MEDICINE

## 2024-08-26 PROCEDURE — 80053 COMPREHEN METABOLIC PANEL: CPT

## 2024-08-26 PROCEDURE — 36415 COLL VENOUS BLD VENIPUNCTURE: CPT

## 2024-08-26 PROCEDURE — 80061 LIPID PANEL: CPT

## 2024-08-26 PROCEDURE — 3008F BODY MASS INDEX DOCD: CPT | Performed by: INTERNAL MEDICINE

## 2024-08-26 PROCEDURE — 84443 ASSAY THYROID STIM HORMONE: CPT

## 2024-08-26 PROCEDURE — 85025 COMPLETE CBC W/AUTO DIFF WBC: CPT

## 2024-08-26 RX ORDER — BUPROPION HYDROCHLORIDE 300 MG/1
300 TABLET ORAL DAILY
Qty: 30 TABLET | Refills: 1 | Status: SHIPPED | OUTPATIENT
Start: 2024-08-26

## 2024-08-26 RX ORDER — PHENTERMINE HYDROCHLORIDE 37.5 MG/1
37.5 CAPSULE ORAL EVERY MORNING
Qty: 30 CAPSULE | Refills: 1 | Status: SHIPPED | OUTPATIENT
Start: 2024-08-26

## 2024-08-26 NOTE — PROGRESS NOTES
CC:   Chief Complaint   Patient presents with    Follow - Up    Weight Loss      Reason for medical consultation: Medical Management of Weight and Weight related comorbidities.      HPI:   Feels wellbutrin has really helped her with emotional eating and anxiety/depression    Initial weight: 240 lbs 8/2023  Current weight: 182 lbs  Interval weight loss: 6 lbs  Total weight loss: 58 lbs    Body mass index is 32.31 kg/m².   Wt Readings from Last 6 Encounters:   08/26/24 182 lb 6.4 oz (82.7 kg)   08/19/24 187 lb (84.8 kg)   06/27/24 188 lb (85.3 kg)   05/06/24 199 lb 12.8 oz (90.6 kg)   04/11/24 202 lb 2.6 oz (91.7 kg)   03/04/24 206 lb 6.4 oz (93.6 kg)     /72   Pulse 93   Ht 5' 3\" (1.6 m)   Wt 182 lb 6.4 oz (82.7 kg)   LMP 04/04/2024   SpO2 99%   BMI 32.31 kg/m²     LABS and RESULTS:     Office Visit on 07/27/2023   Component Date Value    Chlamydia Trachomatis Am* 07/27/2023 Negative     Neisseria Gonorrhoeae Am* 07/27/2023 Negative     Chlam/GC Source 07/27/2023 Cervical/endocervical     Thin Prep Pap 07/27/2023 AP TEST REFLEXED     Interpretation/Result 07/27/2023 Negative for intraepithelial lesion or malignancy     Specimen Adequacy 07/27/2023 Satisfactory for evaluation. No endocervical or metaplastic cells seen     General Categorization 07/27/2023 Negative for intraepithelial lesion or malignancy     HPV High Risk mRNA 07/27/2023                      Value:This result contains rich text formatting which cannot be displayed here.    Recommendations/Comments 07/27/2023                      Value:This result contains rich text formatting which cannot be displayed here.    Procedure 07/27/2023                      Value:This result contains rich text formatting which cannot be displayed here.    Clinical Information 07/27/2023                      Value:This result contains rich text formatting which cannot be displayed here.    Reason for testing 07/27/2023 Screening     Gyn Additional Informati*  07/27/2023                      Value:This result contains rich text formatting which cannot be displayed here.    Case Report 07/27/2023                      Value:Gynecologic Cytology                              Case: [id number]                                  Authorizing Provider:  Becky Romo MD     Collected:           07/27/2023 07:15 PM          Ordering Location:     [facility]    Received:            07/31/2023 10:02 AM                                 Group, [address] [place] [address],                                                                                 [place]                                                                    First Screen:          Lucía Man                                                               Specimen:    ThinPrep Imager Screening Pap, Cervical/endocervical                                       HPV High Risk 07/27/2023 Negative     HPV Source 07/27/2023 Cervical/endocervical        Current Outpatient Medications   Medication Sig Dispense Refill    hydrocortisone 2.5 % External Cream apply to neck twice a day for 1 week      predniSONE 20 MG Oral Tab Take 1 tablet (20 mg total) by mouth every morning.      valACYclovir 1 G Oral Tab       Isotretinoin (ACCUTANE) 20 MG Oral Cap Take 1 capsule (20 mg total) by mouth daily.      buPROPion  MG Oral Tablet 24 Hr Take 1 tablet (150 mg total) by mouth daily. 30 tablet 1    Phentermine HCl 37.5 MG Oral Cap Take 1 capsule (37.5 mg total) by mouth every morning. 30 capsule 1    SUMAtriptan Succinate 100 MG Oral Tab Take 1 tablet (100 mg total) by mouth every 2 (two) hours as needed for Migraine. 30 tablet 1    Adapalene 0.3 % External Gel Apply topically nightly.      ketoconazole 2 % External Shampoo Apply 1 Application topically 3 (three) times a week.      Dapsone 5 % External Gel Apply topically.      tretinoin 0.025 % External Cream Apply topically.        Past Medical History:    Abnormal uterine  bleeding    Allergic rhinitis    Amenorrhea    due ti IUD    Anxiety    Migraine       Past Surgical History:   Procedure Laterality Date    Cyst removal  june 2020    Insert intrauterine device  November 2020    Oophorectomy Left     Salpingectomy Left        Social History:  Social History     Socioeconomic History    Marital status: Single   Tobacco Use    Smoking status: Never    Smokeless tobacco: Never    Tobacco comments:     8 months of vape use only   Substance and Sexual Activity    Alcohol use: Yes     Alcohol/week: 1.0 standard drink of alcohol     Types: 1 Standard drinks or equivalent per week     Comment: Social    Drug use: Never     Social Determinants of Health     Financial Resource Strain: Low Risk  (11/16/2022)    [facility]    Overall Financial Resource Strain (CARDIA)     Difficulty of Paying Living Expenses: Not hard at all   Food Insecurity: No Food Insecurity (11/16/2022)    [facility]    Hunger Vital Sign     Worried About Running Out of Food in the Last Year: Never true     Ran Out of Food in the Last Year: Never true   Transportation Needs: No Transportation Needs (11/16/2022)    [facility]    PRAPARE - Transportation     Lack of Transportation (Medical): No     Lack of Transportation (Non-Medical): No     Family History:  Family History   Problem Relation Age of Onset    Hypertension Father     Breast Cancer Mother 43        Triple Negative Breast Cancer    Cancer Mother         Triple Negative Breast Cancer    Breast Cancer Maternal Grandmother 80          REVIEW OF SYSTEMS:   10 point review of systems otherwise negative.  With the exception of HPI and assessment and plan        EXAM:   /72   Pulse 93   Ht 5' 3\" (1.6 m)   Wt 182 lb 6.4 oz (82.7 kg)   LMP 04/04/2024    SpO2 99%   BMI 32.31 kg/m²   Body mass index is 32.31 kg/m².     GENERAL: NAD, conversant  SKIN: good turgor, no jaundice  HEENT: no scleral icterus,conjunctiva are clear, oropharynx clear, nl external nose and ears  NECK: supple, no carotid bruits, thyroid normal  LYMPH: no cervical LAD, no supraclavicular LAD  LUNGS: nl effort, clear to auscultation bilaterally  CARDIO: RRR, no murmur  ABDOMEN: NT/ND, no masses, no HSM   EXTREMITIES: gait normal, no edema   PSYCH: Oriented times three, appropriate affect    ASSESSMENT AND PLAN:     Nutritional Counseling: educate, track, dietician    1. Obesity, Class III, BMI 40-49.9 (morbid obesity) (HCC)  2. Vitamin D deficiency  3. Migraine with aura and without status migrainosus, not intractable  4. Therapeutic drug monitoring  5. Dysthymic disorder    -Initial weight 240, BMI 42.5  -failed diet and exercise program >6 months  -denies any hx of CAD, htn, kidney stones or seizure  -Topiramate tried and not tolerated due to numbness in fingers  -PHENTERMINE: Since the patient would like to try phentermine, and is aware of the potential side effects (hypertension, palpitations, tachycardia, and anxiety), I will give her a prescription today to be used in conjunction with the above diet and exercise program. The patient will check heart rate and blood pressure on a regular basis. Patient will call me if the BP goes over 140/90 or has palpitations or racing heart rate. They understands that I will not call in the prescription and an appointment is needed to have the medication refilled.   -EKG 8/2023 NSR, possible LAE  -labs 8/2023 vit D deficiency, normal cbc, TSH, cmp  - Echo 12/2023 wnl    Plan:  -Continue Phentermine HCl 37.5 MG Oral Cap; Take 1 capsule (37.5 mg total) by mouth every morning.  Dispense: 30 capsule; Refill: 1  -Increase Wellbutrin to 300 mg for emotional eating  -sumatriptan PRN for migraine attacks  -going to therapy with mental health.   -Discussed Kcal  goals <1800 Kcal, carbohydrate goal <100 grams  -Exercise goal 1 hour daily  -Stress management 10 minutes meditation before bedtime  -Drink at least 64 oz water daily      Regarding Obesity:  Consulted on nutrition and activity  Advised portion-controlled/low carb diet <100 g daily.  Discussed sleep and stress management.    Regarding weight loss medications.  I've discussed with patients the risks and benefits of such weight loss medications.  That these are treatments to be used in conjunction with diet and exercise for promoting health and weight loss.                No follow-ups on file.       Suzanne Moreno MD

## 2024-10-27 DIAGNOSIS — E66.01 OBESITY, CLASS III, BMI 40-49.9 (MORBID OBESITY) (HCC): ICD-10-CM

## 2024-10-29 RX ORDER — BUPROPION HYDROCHLORIDE 300 MG/1
300 TABLET ORAL DAILY
Qty: 30 TABLET | Refills: 0 | Status: SHIPPED | OUTPATIENT
Start: 2024-10-29

## 2024-11-14 PROBLEM — E55.9 VITAMIN D DEFICIENCY: Status: ACTIVE | Noted: 2024-11-14

## 2024-11-14 PROBLEM — E66.01 OBESITY, CLASS III, BMI 40-49.9 (MORBID OBESITY) (HCC): Status: ACTIVE | Noted: 2024-11-14

## 2024-11-14 PROBLEM — E66.813 OBESITY, CLASS III, BMI 40-49.9 (MORBID OBESITY): Status: ACTIVE | Noted: 2024-11-14

## 2024-11-25 DIAGNOSIS — E66.01 OBESITY, CLASS III, BMI 40-49.9 (MORBID OBESITY) (HCC): ICD-10-CM

## 2024-11-28 RX ORDER — BUPROPION HYDROCHLORIDE 300 MG/1
300 TABLET ORAL DAILY
Qty: 30 TABLET | Refills: 0 | OUTPATIENT
Start: 2024-11-28

## 2025-04-08 ENCOUNTER — OFFICE VISIT (OUTPATIENT)
Dept: OBGYN CLINIC | Facility: CLINIC | Age: 25
End: 2025-04-08

## 2025-04-08 VITALS
DIASTOLIC BLOOD PRESSURE: 81 MMHG | BODY MASS INDEX: 33 KG/M2 | WEIGHT: 187.81 LBS | SYSTOLIC BLOOD PRESSURE: 123 MMHG | HEART RATE: 86 BPM

## 2025-04-08 DIAGNOSIS — Z30.432 ENCOUNTER FOR REMOVAL OF INTRAUTERINE CONTRACEPTIVE DEVICE: ICD-10-CM

## 2025-04-08 DIAGNOSIS — Z30.432 ENCOUNTER FOR REMOVAL OF INTRAUTERINE CONTRACEPTIVE DEVICE (IUD): Primary | ICD-10-CM

## 2025-04-08 PROCEDURE — 3074F SYST BP LT 130 MM HG: CPT | Performed by: OBSTETRICS & GYNECOLOGY

## 2025-04-08 PROCEDURE — 99213 OFFICE O/P EST LOW 20 MIN: CPT | Performed by: OBSTETRICS & GYNECOLOGY

## 2025-04-08 PROCEDURE — 3079F DIAST BP 80-89 MM HG: CPT | Performed by: OBSTETRICS & GYNECOLOGY

## 2025-04-08 PROCEDURE — 58301 REMOVE INTRAUTERINE DEVICE: CPT | Performed by: OBSTETRICS & GYNECOLOGY

## 2025-04-08 NOTE — PROGRESS NOTES
IUD removal      Luanne Ramsey is a 25 year old female who presents for IUD removal.    LMP: IUD.    Menses regular: n/a.    Menstrual flow normal: n/a.    Birth control or HRT: IUD.   Refill: none  Last Pap Smear: 8/19/2024 . Any history of abnormal paps: No hx of abn   Gardasil:(age 9-46 y/o) Up to date.   Any medication refills needed today?: None    Problems/concerns: Brown thick spotting, mood fluctuations, struggling to lose weight, cystic acne- thinks it may be due to IUD.    Next Appt: Annual scheduled for august        Immunization History   Administered Date(s) Administered    DTAP 04/25/2000, 06/20/2000, 08/22/2000, 08/20/2001, 02/27/2003, 04/10/2003, 06/16/2003, 03/19/2005    HEP B, Ped/Adol 03/21/2000, 04/25/2000, 08/22/2000    HIB PRP-T 04/25/2000, 06/20/2000, 08/22/2000, 06/11/2001    Hep B, Unspecified Formulation 03/21/2000, 04/25/2000, 08/22/2000, 02/27/2003, 06/16/2003    Hib, Unspecified Formulation 04/25/2000, 06/20/2000, 08/22/2000, 06/11/2001, 02/27/2003, 04/10/2003, 06/16/2003    Hpv Virus Vaccine 9 Angie Im 07/11/2017, 09/14/2017, 01/11/2018    IPV 04/25/2000, 06/20/2000, 11/14/2000, 02/27/2003, 04/10/2003, 01/30/2005    MMR 06/11/2001, 03/19/2005    Meningococcal-Menactra 07/11/2017    Pneumococcal (Prevnar 13) 08/20/2001    Pneumococcal (Prevnar 7) 08/20/2001, 02/27/2003, 04/10/2003, 06/16/2003    RHO(D) Immune Globulin 03/21/2000, 04/25/2000, 08/22/2000, 02/27/2003, 06/16/2003    TDAP 07/30/2011, 11/16/2022    Varicella 08/20/2001, 07/30/2011         Current Outpatient Medications:     SUMAtriptan Succinate 100 MG Oral Tab, Take 1 tablet (100 mg total) by mouth every 2 (two) hours as needed for Migraine., Disp: 30 tablet, Rfl: 1    hydrocortisone 2.5 % External Cream, apply to neck twice a day for 1 week (Patient not taking: Reported on 4/8/2025), Disp: , Rfl:     valACYclovir 1 G Oral Tab, , Disp: , Rfl:     Adapalene 0.3 % External Gel, Apply topically nightly. (Patient not  taking: Reported on 2025), Disp: , Rfl:     Dapsone 5 % External Gel, Apply topically. (Patient not taking: Reported on 2025), Disp: , Rfl:     Allergies[1]    OB History    Para Term  AB Living   0 0 0 0 0 0   SAB IAB Ectopic Multiple Live Births   0 0 0 0 0       Gyn History      Menarche: 12 (2024  4:14 PM)  Period Cycle (Days): IUD (2024  4:14 PM)  Use of Birth Control (if yes, specify type): Mirena IUD (2024  4:14 PM)  Hx Prior Abnormal Pap: No (2024  4:14 PM)  Pap Date: 23 (2024  4:14 PM)  Pap Result Notes: normal (2024  4:14 PM)        Latest Ref Rng & Units 2024     4:35 PM 2023     7:15 PM   RECENT PAP RESULTS   INTERPRETATION/RESULT: Negative for intraepithelial lesion or malignancy Negative for intraepithelial lesion or malignancy  Negative for intraepithelial lesion or malignancy    HPV Negative  Negative            Past Medical History:    Abnormal uterine bleeding    Allergic rhinitis    Amenorrhea    due ti IUD    Anxiety    Migraine       Past Surgical History:   Procedure Laterality Date    Cyst removal  2020    Insert intrauterine device  2020    Oophorectomy Left     Salpingectomy Left        Family History   Problem Relation Age of Onset    Hypertension Father     Breast Cancer Mother 43        Triple Negative Breast Cancer    Cancer Mother         Triple Negative Breast Cancer    Breast Cancer Maternal Grandmother 80        Allergies  Meds         Social History     Socioeconomic History    Marital status: Single     Spouse name: Not on file    Number of children: Not on file    Years of education: Not on file    Highest education level: Not on file   Occupational History    Not on file   Tobacco Use    Smoking status: Never     Passive exposure: Never    Smokeless tobacco: Never    Tobacco comments:     8 months of vape use only   Substance and Sexual Activity    Alcohol use: Yes     Alcohol/week: 1.0 standard  drink of alcohol     Types: 1 Standard drinks or equivalent per week     Comment: Social    Drug use: Never    Sexual activity: Not on file   Other Topics Concern    Caffeine Concern Not Asked    Exercise Not Asked    Seat Belt Not Asked    Special Diet Not Asked    Stress Concern Not Asked    Weight Concern Not Asked   Social History Narrative    Not on file     Social Drivers of Health     Food Insecurity: No Food Insecurity (11/16/2022)    [facility]    Hunger Vital Sign     Worried About Running Out of Food in the Last Year: Never true     Ran Out of Food in the Last Year: Never true   Transportation Needs: No Transportation Needs (11/16/2022)    [facility], [facility]    PRAPARE - Transportation     Lack of Transportation (Medical): No     Lack of Transportation (Non-Medical): No   Stress: Not on file   Housing Stability: Not on file     /81   Pulse 86   Wt 187 lb 12.8 oz (85.2 kg)   LMP 04/04/2024     Wt Readings from Last 3 Encounters:   04/08/25 187 lb 12.8 oz (85.2 kg)   11/14/24 184 lb (83.5 kg)   08/26/24 182 lb 6.4 oz (82.7 kg)       Health Maintenance   Topic Date Due    Annual Physical  08/29/2024    COVID-19 Vaccine (1 - 2024-25 season) Never done    Annual Depression Screening  01/01/2025    Influenza Vaccine (Season Ended) 10/01/2025    Pap Smear  08/19/2027    DTaP,Tdap,and Td Vaccines (8 - Td or Tdap) 11/16/2032    HPV Vaccines  Completed    Pneumococcal Vaccine: Birth to 50yrs  Aged Out    Meningococcal B Vaccine  Aged Out         Review of Systems   General: Present- Feeling well. Not Present- Fever.  Female Genitourinary: Not Present- Dysmenorrhea, Dyspareunia, Flank Pain, Frequency, Menstrual Irregularities, Pelvic Pain, Urgency, Urinary Complaints, Vaginal Bleeding and Vaginal dryness.  Pain: Present- Pain Rating - 0 on a 0-10 scale.  All other systems  negative       Physical Exam   The physical exam findings are as follows:     Abdomen   Inspection: - Inspection Normal.  Palpation/Percussion: Palpation and Percussion of the abdomen reveal - Non Tender, No hepatosplenomegaly and No Palpable abdominal masses.    Female Genitourinary     External Genitalia   Perineum - Normal. Bartholin's Gland - Bilateral - Normal. Clitoris - Normal.  Introitus: Characteristics - Normal. Discharge - None.  Labia Majora: Lesions - Bilateral - None. Characteristics - Bilateral - Normal.  Labia Minora: Lesions - Bilateral - None. Characteristics - Bilateral - Normal.  Urethra: Characteristics - Normal. Discharge - None.  [place] Gland - Bilateral - Normal.  Vulva: Characteristics - Normal. Lesions - None.    Speculum & Bimanual   Vagina:   Vaginal Wall: - Normal.  Vaginal Lesions - None. Vaginal Mucosa - Normal.  Cervix: Characteristics - No Motion tenderness. Discharge - None.  Uterus: Characteristics - Non Tender. Position - Midposition.  Adnexa: Characteristics - Bilateral - Tender. Masses - No Adnexal Masses.  Bladder - Normal.      Rectal   Anorectal Exam: External - normal external exam.    Lymphatic  General Lymphatics   Description - Normal .    IUD removal   Risks, benefits, alternatives, and indications of IUD removal reviewed with patient.  The patient voices clear understanding and desires to proceed.  Consent for IUD removal was signed and witnessed by assistant.    Bimanual exam was performed, and the uterus was noted to be anteverted .A speculum was placed in the vagina.  The IUD strings and cervix were visualized.  The IUD strings were grasped with a ring forceps.  The IUD was then removed without difficulty.      The patient tolerated the procedure well.  There were no complications.  IUD information was given to the patient.          1. Encounter for removal of intrauterine contraceptive device (IUD)    2. Encounter for removal of intrauterine contraceptive device                     [1]   Allergies  Allergen Reactions    Amoxicillin RASH    [place] HIVES    Pecan Nut (Diagnostic) SWELLING    Prunus Persica HIVES    Walnuts SWELLING

## 2025-04-24 ENCOUNTER — TELEPHONE (OUTPATIENT)
Dept: OBGYN CLINIC | Facility: CLINIC | Age: 25
End: 2025-04-24

## 2025-04-24 NOTE — TELEPHONE ENCOUNTER
Pt name and  verified     Pt rescheduled for annual on  at [place]. Pt aware of scheduling details.